# Patient Record
Sex: FEMALE | Race: WHITE | Employment: OTHER | URBAN - METROPOLITAN AREA
[De-identification: names, ages, dates, MRNs, and addresses within clinical notes are randomized per-mention and may not be internally consistent; named-entity substitution may affect disease eponyms.]

---

## 2018-10-15 ENCOUNTER — HOSPITAL ENCOUNTER (OUTPATIENT)
Dept: CT IMAGING | Age: 83
Discharge: HOME | End: 2018-10-15
Payer: MEDICARE

## 2018-10-15 DIAGNOSIS — D3A.029 BENIGN CARCINOID TUMOR OF THE LARGE INTESTINE, UNSPECIFIED PORTION: ICD-10-CM

## 2018-10-15 DIAGNOSIS — D3A.029: ICD-10-CM

## 2018-10-15 LAB
PERFORMED ON: NORMAL
POC SAMPLE TYPE: NORMAL

## 2018-10-15 PROCEDURE — 74177 CT ABD & PELVIS W/CONTRAST: CPT

## 2018-10-15 PROCEDURE — 6360000004 HC RX CONTRAST MEDICATION: Performed by: INTERNAL MEDICINE

## 2018-10-15 RX ADMIN — IOHEXOL 50 ML: 240 INJECTION, SOLUTION INTRATHECAL; INTRAVASCULAR; INTRAVENOUS; ORAL at 10:24

## 2018-10-15 RX ADMIN — IOPAMIDOL 80 ML: 755 INJECTION, SOLUTION INTRAVENOUS at 10:24

## 2019-03-11 ENCOUNTER — PROCEDURE VISIT (OUTPATIENT)
Dept: SURGERY | Age: 84
End: 2019-03-11
Payer: MEDICARE

## 2019-03-11 VITALS
DIASTOLIC BLOOD PRESSURE: 82 MMHG | WEIGHT: 177 LBS | HEART RATE: 71 BPM | SYSTOLIC BLOOD PRESSURE: 119 MMHG | BODY MASS INDEX: 28.45 KG/M2 | OXYGEN SATURATION: 94 %

## 2019-03-11 DIAGNOSIS — C44.02 SQUAMOUS CELL CARCINOMA OF SKIN OF UPPER LIP: Primary | ICD-10-CM

## 2019-03-11 PROCEDURE — 17311 MOHS 1 STAGE H/N/HF/G: CPT | Performed by: DERMATOLOGY

## 2019-03-11 PROCEDURE — 14060 TIS TRNFR E/N/E/L 10 SQ CM/<: CPT | Performed by: DERMATOLOGY

## 2019-03-12 ENCOUNTER — TELEPHONE (OUTPATIENT)
Dept: SURGERY | Age: 84
End: 2019-03-12

## 2019-03-18 ENCOUNTER — OFFICE VISIT (OUTPATIENT)
Dept: SURGERY | Age: 84
End: 2019-03-18

## 2019-03-18 DIAGNOSIS — Z48.02 VISIT FOR SUTURE REMOVAL: Primary | ICD-10-CM

## 2019-03-18 PROCEDURE — 99024 POSTOP FOLLOW-UP VISIT: CPT | Performed by: DERMATOLOGY

## 2019-04-08 ENCOUNTER — TELEPHONE (OUTPATIENT)
Dept: SURGERY | Age: 84
End: 2019-04-08

## 2019-04-08 NOTE — TELEPHONE ENCOUNTER
Returned patients call and discussed applying polysporin twice daily and massaging and if not resolved in a week call back to schedule an appt.

## 2019-04-15 ENCOUNTER — OFFICE VISIT (OUTPATIENT)
Dept: SURGERY | Age: 84
End: 2019-04-15

## 2019-04-15 ENCOUNTER — TELEPHONE (OUTPATIENT)
Dept: SURGERY | Age: 84
End: 2019-04-15

## 2019-04-15 DIAGNOSIS — Z51.89 VISIT FOR WOUND CHECK: Primary | ICD-10-CM

## 2019-04-15 PROCEDURE — 99024 POSTOP FOLLOW-UP VISIT: CPT | Performed by: DERMATOLOGY

## 2019-04-15 NOTE — PROGRESS NOTES
S: The patient is here for scar check s/p Mohs on the right upper cutaneous lip with Unilateral Advancement Flap repair, 5 weeks ago. The patient c/o firmness to scar. Happy with appearance but despite 15 minutes of massage daily for the past few weeks, feels no improvement in firm areas. O: The scar is well-approximated and shows no signs of abnormal healing (expected amount of erythema, fullness and coloration), focal areas of firmness in scar. Very good cosmesis. A/P:scar tissue is firm but wnl for post operative period. rec massage only 5 minutes a day and reassured pt that with time, this scar will soften. Patient questions answered and expectations reviewed. The patient is scheduled for f/u scar check in June and skin examination with General Dermatology per their recommendations.

## 2019-04-15 NOTE — TELEPHONE ENCOUNTER
FYI - Pt called previously spoke with nursing staff and called today with swelling and is concerned if site is healing properly, R upper cutaneous lip from 3/11/19. Pt was added to today's schedule. Please close encounter when read.

## 2019-06-03 ENCOUNTER — OFFICE VISIT (OUTPATIENT)
Dept: SURGERY | Age: 84
End: 2019-06-03
Payer: MEDICARE

## 2019-06-03 DIAGNOSIS — L90.5 SCAR OF LIP: Primary | ICD-10-CM

## 2019-06-03 PROCEDURE — 99024 POSTOP FOLLOW-UP VISIT: CPT | Performed by: DERMATOLOGY

## 2019-06-03 PROCEDURE — 11900 INJECT SKIN LESIONS </W 7: CPT | Performed by: DERMATOLOGY

## 2019-06-03 NOTE — PATIENT INSTRUCTIONS
[facility] Surgery Office Hours:    Monday-Thursday  7:30 AM-4:30 PM    Friday  9:00 AM-3:00 PM    Wound Care Massaging Instruction    Starting at approximately two weeks following surgery, we often ask that our patients begin massaging their wound on a regular basis. We suggest 5 minutes every morning and 5 minutes every night using an emollient such as Aquaphor, Vaseline or Eucerin cream.  The sutures that were placed underneath the surface of the skin take about 70 days to dissolve, and during that time, they can cause lumps along the line of the scar. These lumps will eventually go away on their own, but the use of regular massage will help speed the process as well as help soften the scar tissue more quickly. Please continue to use sunscreen, SPF 45 or higher during this time.

## 2019-06-04 NOTE — PROGRESS NOTES
S: The patient is here for scar check s/p MOhs on the right upper lip with Unilateral Advancement Flap repair, 12 weeks ago. The patient c/o small area of firmness at apex of scar. O: The scar is well-approximated and shows no signs of abnormal healing (expected amount of erythema, fullness and coloration), there is a small area of firmness of scar tissue at medial vermillion. A/P: D/w patient intralesional Kenalog injection to help flatten and soften the scar. The patient opted to proceed with injection. 0.2 of 10mg/cc ILK injected into scar. Instructed to massage. Patient questions answered and expectations reviewed. The patient is scheduled for f/u scar check in 1 month as needed and skin examination with General Dermatology per their recommendations.

## 2019-11-01 ENCOUNTER — HOSPITAL ENCOUNTER (OUTPATIENT)
Dept: CT IMAGING | Age: 84
Discharge: HOME | End: 2019-11-01
Payer: MEDICARE

## 2019-11-01 DIAGNOSIS — C7A.029 MALIGNANT CARCINOID TUMOR OF LARGE INTESTINE, UNSPECIFIED LOCATION (HCC): ICD-10-CM

## 2019-11-01 LAB
GFR AFRICAN AMERICAN: 57
GFR NON-AFRICAN AMERICAN: 47
PERFORMED ON: ABNORMAL
POC CREATININE: 1.1 MG/DL (ref 0.6–1.2)
POC SAMPLE TYPE: ABNORMAL

## 2019-11-01 PROCEDURE — 6360000004 HC RX CONTRAST MEDICATION: Performed by: FAMILY MEDICINE

## 2019-11-01 PROCEDURE — 82565 ASSAY OF CREATININE: CPT

## 2019-11-01 PROCEDURE — 74177 CT ABD & PELVIS W/CONTRAST: CPT

## 2019-11-01 RX ADMIN — IOPAMIDOL 80 ML: 755 INJECTION, SOLUTION INTRAVENOUS at 12:03

## 2019-11-01 RX ADMIN — IOHEXOL 50 ML: 240 INJECTION, SOLUTION INTRATHECAL; INTRAVASCULAR; INTRAVENOUS; ORAL at 12:03

## 2020-11-16 ENCOUNTER — HOSPITAL ENCOUNTER (OUTPATIENT)
Dept: CT IMAGING | Age: 85
Discharge: HOME | End: 2020-11-16
Payer: MEDICARE

## 2020-11-16 PROCEDURE — 82565 ASSAY OF CREATININE: CPT

## 2020-11-16 PROCEDURE — 6360000004 HC RX CONTRAST MEDICATION: Performed by: NURSE PRACTITIONER

## 2020-11-16 PROCEDURE — 74177 CT ABD & PELVIS W/CONTRAST: CPT

## 2020-11-16 RX ADMIN — IOPAMIDOL 80 ML: 755 INJECTION, SOLUTION INTRAVENOUS at 12:35

## 2020-11-16 RX ADMIN — IOHEXOL 50 ML: 240 INJECTION, SOLUTION INTRATHECAL; INTRAVASCULAR; INTRAVENOUS; ORAL at 12:36

## 2023-09-05 ENCOUNTER — TELEPHONE (OUTPATIENT)
Dept: SURGERY | Age: 88
End: 2023-09-05

## 2023-09-05 NOTE — TELEPHONE ENCOUNTER
PT LM on VM- \"she needs to cancel appt for this morning. She's tested positive for covid\"  Please call her as this spot has been bothering her and she was looking forward to surgery.

## 2023-09-06 NOTE — TELEPHONE ENCOUNTER
Pt has Covid and cancelled her appt yesterday 9/5. Is it ok to schedule her without having a test completed? Uncertain of the protocol and it would be best for the nursing staff to disclose that to pt. Or if it's ok for me to proceed in scheduling pt in November I will. (Mohs - [facility], R nasal tip, diagnosed 9/19/2023 by Dr. Joy Martines).

## 2023-11-06 ENCOUNTER — PROCEDURE VISIT (OUTPATIENT)
Dept: SURGERY | Age: 88
End: 2023-11-06
Payer: MEDICARE

## 2023-11-06 VITALS — SYSTOLIC BLOOD PRESSURE: 104 MMHG | DIASTOLIC BLOOD PRESSURE: 77 MMHG | HEART RATE: 71 BPM

## 2023-11-06 DIAGNOSIS — C44.311 BASAL CELL CARCINOMA (BCC) OF RIGHT NASAL TIP: Primary | ICD-10-CM

## 2023-11-06 DIAGNOSIS — Z79.01 ANTICOAGULATED: ICD-10-CM

## 2023-11-06 PROCEDURE — 14060 TIS TRNFR E/N/E/L 10 SQ CM/<: CPT | Performed by: DERMATOLOGY

## 2023-11-06 PROCEDURE — 17312 MOHS ADDL STAGE: CPT | Performed by: DERMATOLOGY

## 2023-11-06 PROCEDURE — 17311 MOHS 1 STAGE H/N/HF/G: CPT | Performed by: DERMATOLOGY

## 2023-11-06 NOTE — PATIENT INSTRUCTIONS
[facility] Surgery Office Hours:    Monday-Thursday  7:30 AM-4:30 PM    Friday  9:00 AM-1:00 PM       POST-OPERATIVE CARE FOR STITCHES  Bandage change after 48 hours    CARING FOR YOUR SURGICAL SITE  The bandage should remain on and completely dry for 48 hours. Do NOT get the bandage wet. After the first 48 hours, gently remove the remaining part of the bandage. It can be helpful to moisten the bandage edges in the shower. Steri strips may still be on the wound. It is ok, they will fall off slowly with the daily bandage changes. Gently clean the wound daily with mild soap and water. Try to clean off crust and debris. Dry (pat) the area with a clean Q-tip or gauze. Apply a layer of Vaseline/ Aquaphor (or Bacitracin if your doctor recommends) to the wound area only. Cut a piece of Telfa (or any non-stick dressing) to fit just over the wound and secure it with paper tape. If the wound is small you may use a Band- Aid. Keep area covered for a total of 1 week(s). If the dressing comes off or if you have questions, or concerns about the dressing, please call the office for instructions! POST OPERATIVE INSTRUCTIONS    Activity: Do not lift anything heavier than a gallon of milk for 1 week. Also, avoid strenuous activity such as running, power walking or contact sports. Eating and drinking: Do not drink alcohol for 48 hours after your procedure. Alcohol increases the chances of bleeding. Medicines   -If you have discomfort, take Acetaminophen (Tylenol or Extra Strength Tylenol). Follow the instructions and warning on the bottle. -If your doctor has prescribed you an Aspirin daily, please keep taking it. Do not take extra Aspirin or medicines containing Aspirin (such as Jane[place] and Excedrin) for 48 hours after your procedure. Bleeding: If bleeding occurs, DO NOT remove the bandage. Put firm pressure on the area with gauze for 20 minutes without peeking.  If the bleeding continues, apply

## 2023-11-06 NOTE — PROGRESS NOTES
PRE-PROCEDURE SCREENING    Pacemaker/ICD: No  Difficulty with numbing in the past: No  Local Anesthesia Reaction/passing out: No  Latex or adhesive allergy:  No  Bleeding/Clotting Disorders: No  Anticoagulant Therapy: Yes - Plavix & Aspirin 81 mg at times (not daily)   Joint prosthesis: No  Artificial Heart Valve: No  Stroke or Seizures: No  Organ Transplant or Lymphoma: No  Immunosuppression: No  Respiratory Problems: Yes  - shortness of breath due to A- Fib at times

## 2023-11-06 NOTE — PROGRESS NOTES
MOHS PROCEDURE NOTE    PHYSICIAN:  Catherine Sellers. Bo Cameron MD, Who operated in two distinct and integrated capacities as the surgeon removing the tissue and as the pathologist examining the tissue. ASSISTANT: Carole Walter RN; Edmundo Aldana RN; Reynaldo Lomeli, [place]     REFERRING PROVIDER:  Adela Gomes MD    PREOPERATIVE DIAGNOSIS: Nodular Basal Cell Carcinoma     SPECIFIC MOHS INDICATIONS:  location, clinically ill-defined borders, and need for tissue conservation    AUC SCORIN/9    POSTOPERATIVE DIAGNOSIS: SAME    LOCATION: Right Nasal Tip    OPERATIVE PROCEDURE:  MOHS MICROGRAPHIC SURGERY    RECONSTRUCTION OF DEFECT: V to Y Advancement Flap (non-tunneled island pedicle flap)    PREOPERATIVE SIZE: 6 x 5 MM    DEFECT SIZE: 13 x 11 MM    LENGTH OF REPAIRED WOUND/SIZE OF FLAP/SIZE OF GRAFT:  6.37cm2    ANESTHESIA:  12.5 mL 0.5% lidocaine with epinephrine 1:200,000 buffered. EBL:  MINIMAL    DURATION OF PROCEDURE:  3 HOURS    POSTOPERATIVE OBSERVATION: 1 HOUR    SPECIMENS:  SEE MOHS MAP    COMPLICATIONS:  NONE    DESCRIPTION OF PROCEDURE:  The patient was given a mirror, as appropriate, and the biopsy site was identified, marked with a surgical marking pen, and verified by the patient. Options for treatment were discussed and the patient was informed that Mohs surgery was the selected treatment based on its lower recurrence rate, given the features listed above, as compared to other treatment modalities such as excision, radiation, or curettage, and agreed with this treatment plan. Risks and benefits including bruising, swelling, bleeding, infection, nerve injury, recurrence, and scarring were discussed with the patient prior to the procedure and a written consent detailing these and other risks was reviewed with the patient and signed. There was a time out for person and procedure verification. The surgical site was prepped with an antiseptic solution.   Application of an antiseptic solution was

## 2023-11-07 ENCOUNTER — OFFICE VISIT (OUTPATIENT)
Dept: SURGERY | Age: 88
End: 2023-11-07

## 2023-11-07 ENCOUNTER — TELEPHONE (OUTPATIENT)
Dept: SURGERY | Age: 88
End: 2023-11-07

## 2023-11-07 DIAGNOSIS — Z51.89 VISIT FOR WOUND CHECK: ICD-10-CM

## 2023-11-07 DIAGNOSIS — R58 BLEEDING: Primary | ICD-10-CM

## 2023-11-07 PROCEDURE — 99024 POSTOP FOLLOW-UP VISIT: CPT | Performed by: DERMATOLOGY

## 2023-11-07 NOTE — TELEPHONE ENCOUNTER
At 9:12 a.m. called patient to gather more information about her bleeding episode. Patient states she has been trying to get the bleeding stopped and needs assistance. Patient coming into office as soon as she can with her . She's aware of our surgery schedule and that we will see her as soon as possible.

## 2023-11-07 NOTE — TELEPHONE ENCOUNTER
The patient was having bleeding as of 3 a.m. this morning. The bleeding has slowed down and had applied 2 sessions of 20 min of bleeding. She's requesting to be seen.

## 2023-11-07 NOTE — PROGRESS NOTES
S: The patient is here today for wound/scar check. The patient had mohs surgery located on the right nasal ala with V to Y Advancement Flap (non-tunneled island pedicle flap) repair, 1 day ago. The patient c/o bleeding overnight. Attempted pressure which slowed the bleeding but did not stop it entirely. EXAM: skin edge bleeding from superior aspect of flap. IMPRESSION/PLAN:  area anesthetized with 2cc 0.5% lido with epi buffered and 4 6-0 Prolene sutures placed for hemostasis. In addition the incision line was draped with surgicel and liquid skin adhesive and a pressure dressing applied. No visible bleeding after observing patient for 10 minutes in office. She left in stable condition. No sign of hematoma. F/u in 6 days.

## 2023-11-07 NOTE — PATIENT INSTRUCTIONS
[facility] Office Hours:    Monday-Thursday  7:30 AM-4:30 PM    Friday  9:00 AM-1:00 PM     PT 's area of bleeding was cleaned/re-sutured,warm compress/saline and nasal spray  used for the dried blood in the nostrils (2 samples nasal spray given to PT to continue to use today to help w/any additional dried blood in her nostrils). Dermabond/surgicel applied  & heavy pressure dressing applied after PT sat for 10 min to determine if any additional bleeding. None noted. PT was advised we will call and check on her abby to make sure all doing ok.

## 2023-11-08 ENCOUNTER — TELEPHONE (OUTPATIENT)
Dept: SURGERY | Age: 88
End: 2023-11-08

## 2023-11-08 NOTE — TELEPHONE ENCOUNTER
The patient was in the office on 11/6/2023 for Mohs located on the RT nasal tip with V-Y advancement flap(non-tunneled pedicle flap) repair. The patient tolerated the procedure well and left the office in good condition. Pain level on post-operative day 1:  none & no Tylenol has been needed. She feels she is doing well. Bandage stayed on and no additional bleeding after PT was seen 11/7/2023 for bleeding and bandage change. Dr. Trevin Oneal advised that PT doing well today. Any bleeding episode that required pressure to be held, bandage change or a call to the office or MD?  no     Any other issues?:  no    A post-operative telephone call was placed at 9:17a, 11/8/2023,  in order to check on the patient's recovery process. The patient reported doing well and had no complaints other than those listed above, if any. All of the patient's questions were answered. Advised to call w/any questions/concerns.

## 2023-11-13 ENCOUNTER — OFFICE VISIT (OUTPATIENT)
Dept: SURGERY | Age: 88
End: 2023-11-13

## 2023-11-13 DIAGNOSIS — Z48.02 VISIT FOR SUTURE REMOVAL: Primary | ICD-10-CM

## 2023-11-13 PROCEDURE — 99024 POSTOP FOLLOW-UP VISIT: CPT | Performed by: DERMATOLOGY

## 2023-11-13 NOTE — PROGRESS NOTES
S:  The patient is here for suture removal s/p Mohs surgery on the right nasal tip and V to Y Advancement Flap (non-tunneled island pedicle flap) repair, 1 week(s) ago. The site appears well-healed without signs of infection (redness, pain or discharge). The sutures were removed. Flap looks very good- few areas still healing but overall nice take and good alar position. Wound care and activity instructions given. The patient was scheduled for follow-up 2 weeks for scar/wound check. The patient was scheduled for f/u with General Dermatology per their instructions. Electronically signed by Sarah Beth Corey RN on 11/13/2023 at 3:14 PM    I, Sarah Beth Corey RN, am scribing for and in the presence of Dr.Emily Jerome,11/13/23. José Manuel Bateman MD,  personally performed the services described in this documentation as scribed by Sarah Beth Corey RN  in my presence, and it is both accurate and complete.      Electronically signed by Tyra Larry MD on 11/13/2023 at 3:36 PM

## 2023-11-28 ENCOUNTER — OFFICE VISIT (OUTPATIENT)
Dept: SURGERY | Age: 88
End: 2023-11-28

## 2023-11-28 DIAGNOSIS — Z51.89 VISIT FOR WOUND CHECK: Primary | ICD-10-CM

## 2023-11-28 PROCEDURE — 99024 POSTOP FOLLOW-UP VISIT: CPT | Performed by: DERMATOLOGY

## 2023-11-28 NOTE — PROGRESS NOTES
S: The patient is here for wound check s/p Mohs on the right nasal tip with V to Y Advancement Flap (non-tunneled island pedicle flap) repair, 3 weeks ago. The patient denies any complaints and feels the area is healing well without complications. O: The wound/scar shows no signs of infection/bleeding or other complications (no erythema, edema, pain, purulence or drainage). Flap has healed well and pt has normal alar contour. Scar tissue in inner nares should flatten with time. Pt reports no issues with breathing. .  A/P:  No issues. Patient questions answered and expectations reviewed. The patient is scheduled for f/u scar check in 2-3 months prn and skin examination with General Dermatology per their recommendations. Abi Castanon RN, am scribing for and in the presence of Dr. Howell. Electronically signed by Emerson Calhoun RN on 11/28/2023 at 3:11 PM     I, Booker Chaparro MD,  personally performed the services described in this documentation as scribed by Emerson Calhoun RN  in my presence, and it is both accurate and complete.      Electronically signed by Joanie Leal MD on 11/28/2023 at 3:24 PM

## 2023-11-28 NOTE — PATIENT INSTRUCTIONS
[facility] Surgery Office Hours:    Monday-Thursday  7:30 AM-4:30 PM    Friday  9:00 AM-1:00 PM    Apply vaseline or aquaphor over the tip for a week or two. Wound Care Massaging Instruction    Starting at approximately about 4 weeks following surgery, we often ask that our patients begin massaging their wound on a regular basis. We suggest just a few minutes once a day. It is helpful to use an emollient such as Aquaphor, Vaseline or Eucerin cream.  The sutures that were placed underneath the surface of the skin take about 70 days to dissolve, and during that time, they can cause lumps along the line of the scar. These lumps will eventually go away on their own, but the use of regular massage will help speed the process as well as help soften the scar tissue more quickly. Please continue to use sunscreen, SPF 45 or higher during this time.

## 2024-01-29 ENCOUNTER — OFFICE VISIT (OUTPATIENT)
Dept: SURGERY | Age: 89
End: 2024-01-29

## 2024-01-29 DIAGNOSIS — L90.5 SCAR: Primary | ICD-10-CM

## 2024-01-29 PROCEDURE — 99024 POSTOP FOLLOW-UP VISIT: CPT | Performed by: DERMATOLOGY

## 2024-01-30 NOTE — PROGRESS NOTES
S: The patient is here for scar check s/p mohs on the right ala with V to Y Advancement Flap (non-tunneled island pedicle flap) repair, 11 weeks ago.  The patient c/o persistent slight fullness inside nose but no issues with air movement/collapse.  O: The scar is well-approximated and shows no signs of abnormal healing (expected amount of erythema, fullness and coloration), the scar is slightly elevated. No valve collapse  A/P:scar with slight elevation - offered pt ilk but she declined today.  Cont massage and will likely still continue to improve with time.  Patient questions answered and expectations reviewed.  The patient is scheduled for f/u scar check as needed and skin examination with General Dermatology per their recommendations.

## 2024-07-22 ENCOUNTER — OFFICE VISIT (OUTPATIENT)
Dept: SURGERY | Age: 89
End: 2024-07-22
Payer: MEDICARE

## 2024-07-22 ENCOUNTER — TELEPHONE (OUTPATIENT)
Dept: SURGERY | Age: 89
End: 2024-07-22

## 2024-07-22 DIAGNOSIS — D48.9 NEOPLASM OF UNCERTAIN BEHAVIOR: ICD-10-CM

## 2024-07-22 DIAGNOSIS — L90.5 SCAR: Primary | ICD-10-CM

## 2024-07-22 PROCEDURE — 99212 OFFICE O/P EST SF 10 MIN: CPT | Performed by: DERMATOLOGY

## 2024-07-22 PROCEDURE — 1123F ACP DISCUSS/DSCN MKR DOCD: CPT | Performed by: DERMATOLOGY

## 2024-07-22 PROCEDURE — 11102 TANGNTL BX SKIN SINGLE LES: CPT | Performed by: DERMATOLOGY

## 2024-07-22 NOTE — PATIENT INSTRUCTIONS
[facility] Office Hours:    Monday-Thursday  7:30 AM-4:30 PM    Friday  9:00 AM-1:00 PM     Biopsy Wound Care Instructions  Cleanse the wound with mild soapy water daily.   Gently dry the area.  Apply Vaseline or petroleum jelly to the wound using a cotton tipped applicator.  Cover with a clean bandage.  Repeat this process until the biopsy site is healed.  If you had stitches placed, continue treating the site until the stitches are removed. Remember to make an appointment to return to have your stitches removed by our staff.  You may shower and bathe as usual.   ** Biopsy results generally take around 7 business days to come back.  If you have not heard from us by then, please call the office at [phone] between 8AM and 4PM Monday through Friday.

## 2024-07-22 NOTE — PROGRESS NOTES
S: The patient is here for scar check s/p mohs on the right nose for a nodular bcc with V to Y Advancement Flap (non-tunneled island pedicle flap) repair, 8 months ago.  The patient c/o a bump in the area of the scar and feels nose looks swollen.    She also c/o new onset lesion on left hand x 3 weeks. No previous tx.    O: The scar is well-approximated and shows no signs of abnormal healing (expected amount of erythema, fullness and coloration), scar has excellent cosmesis.    Left hand with a 10mm keratotic erythematous papule    A/P:  scar with very good cosmesis. I do not appreciate any excessive fullness or change in shape to the nose. It looks symmetric and with even coloring from scar tissue to unaffected portions of the nose. No s/sx of recurrence. No functional issues.    2. Neoplasm of uncertain behavior:  R/O SCC  Location: left hand  - Discussed possible diagnosis. Patient agreeable to biopsy. Verbal consent obtained after risks (infection, bleeding, scar), benefits and alternatives explained.   - The area to be biopsied was marked and a verbal time-out was performed.  - Local anesthesia was achieved with 1% lidocaine with epinephrine/sodium bicarbonate.   - The area was cleansed with alcohol and a tangential shave biopsy was performed using a derma-blade.  Hemostasis was achieved with aluminum chloride.  A small amount of petroleum jelly was applied to the wound and a band-aid applied.   - The specimen was placed in a correctly identified specimen container.  - One specimen was sent to pathology. There were no immediate complications and the patient left the office in good condition.  -  Patient educated re: risk of bleeding, infection, scar and wound care instructions reviewed.  The patient will be informed of biopsy results by phone or letter as soon as available.     Patient questions answered and expectations reviewed.  The patient is scheduled for f/u scar check as needed and skin examination with

## 2024-07-22 NOTE — TELEPHONE ENCOUNTER
Pt complaining of a bump on the incision line, she noticed it a month ago and she says it's not right.  She complains of swelling and discoloration. She mentioned that her  and daughter claims it does not look right as well and wanted Dr. Jerome to check it.     Please call cell phone at [phone].      Pt unable to send photo.  I added her the schedule today for 3:15 pm; if she should not come in please call pt and provide another date.

## 2024-07-25 ENCOUNTER — TELEPHONE (OUTPATIENT)
Dept: SURGERY | Age: 89
End: 2024-07-25

## 2024-07-25 LAB — DERMATOLOGY PATHOLOGY REPORT: ABNORMAL

## 2024-07-25 NOTE — TELEPHONE ENCOUNTER
I spoke with the patient today by telephone.  I reviewed results of the biopsy with the patient.    Date of biopsy: 7/22/24  Site of biopsy: Left Hand  Result: SCC, well diff    Plan: needs MOHS procedure    The patient expressed understanding of the plan.

## 2024-07-25 NOTE — TELEPHONE ENCOUNTER
----- Message from Chelsea Jerome MD sent at 7/25/2024 12:51 PM EDT -----  Biopsy positive for skin cancer - needs Mohs.

## 2024-08-26 ENCOUNTER — PROCEDURE VISIT (OUTPATIENT)
Dept: SURGERY | Age: 89
End: 2024-08-26
Payer: MEDICARE

## 2024-08-26 VITALS — DIASTOLIC BLOOD PRESSURE: 72 MMHG | HEART RATE: 80 BPM | SYSTOLIC BLOOD PRESSURE: 134 MMHG

## 2024-08-26 DIAGNOSIS — C44.629 SQUAMOUS CELL CARCINOMA OF LEFT HAND: Primary | ICD-10-CM

## 2024-08-26 DIAGNOSIS — Z79.01 ANTICOAGULATED: ICD-10-CM

## 2024-08-26 PROCEDURE — 12042 INTMD RPR N-HF/GENIT2.6-7.5: CPT | Performed by: DERMATOLOGY

## 2024-08-26 PROCEDURE — 17311 MOHS 1 STAGE H/N/HF/G: CPT | Performed by: DERMATOLOGY

## 2024-08-26 NOTE — PROGRESS NOTES
PRE-PROCEDURE SCREENING    Pacemaker/ICD: No, but has a watchman  Difficulty with numbing in the past: No  Local Anesthesia Reaction/passing out: No  Latex or adhesive allergy:  No  Any history of reaction to suture or skin glue:  no  Bleeding/Clotting Disorders: No  Anticoagulant Therapy: Yes, xarelto  Joint prosthesis: No  Artificial Heart Valve: No  Stroke or Seizures: No  Organ Transplant or Lymphoma: No  Immunosuppression: No  Respiratory Problems: No

## 2024-08-26 NOTE — PROGRESS NOTES
MOHS PROCEDURE NOTE    PHYSICIAN:  Chelsea Jerome MD, Who operated in two distinct and integrated capacities as the surgeon removing the tissue and as the pathologist examining the tissue.    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     EBL:  MINIMAL    DURATION OF PROCEDURE:  1.5 HOURS    POSTOPERATIVE OBSERVATION: 0.5 HOUR    SPECIMENS:  SEE MOHS MAP    COMPLICATIONS:  NONE    DESCRIPTION OF PROCEDURE:  The patient was given a mirror, as appropriate, and the biopsy site was identified, marked with a surgical marking pen, and verified by the patient.   Options for treatment were discussed and the patient was informed that Mohs surgery was the selected treatment based on its lower recurrence rate, given the features listed above, as compared to other treatment modalities such as excision, radiation, or curettage, and agreed with this treatment plan.  Risks and benefits including bruising, swelling, bleeding, infection, nerve injury, recurrence, and scarring were discussed with the patient prior to the procedure and a written consent detailing these and other risks was reviewed with the patient and signed.    There was a time out for person and procedure verification.  The surgical site was prepped with an antiseptic solution.  Application of an antiseptic solution was repeated before each surgical stage.      Stage I:  The clinically-apparent tumor was carefully

## 2024-08-26 NOTE — PATIENT INSTRUCTIONS
[facility] Office Hours:    Monday-Thursday  7:30 AM-4:30 PM    Friday  9:00 AM-1:00 PM      POST-OPERATIVE CARE FOR STITCHES  Bandage change after 48 hours    CARING FOR YOUR SURGICAL SITE  The bandage should remain on and completely dry for 48 hours. Do NOT get the bandage wet.  After the first 48 hours, gently remove the remaining part of the bandage. It can be helpful to moisten the bandage edges in the shower. Steri strips may still be on the wound. It is ok, they will fall off slowly with the daily bandage changes.  Gently clean the wound daily with mild soap and water. Try to clean off crust and debris.   Dry (pat) the area with a clean Q-tip or gauze.   Apply a layer of Vaseline/ Aquaphor (or Bacitracin if your doctor recommends) to the wound area only.  Cut a piece of Telfa (or any non-stick dressing) to fit just over the wound and secure it with paper tape. If the wound is small you may use a Band- Aid. Keep area covered for a total of 2 week(s).  If the dressing comes off or if you have questions, or concerns about the dressing, please call the office for instructions!    POST OPERATIVE INSTRUCTIONS    Activity: Do not lift anything heavier than a gallon of milk for 1 week. Also, avoid strenuous activity such as running, power walking or contact sports.  Eating and drinking: Do not drink alcohol for 48 hours after your procedure. Alcohol increases the chances of bleeding.  Medicines   -If you have discomfort, take Acetaminophen (Tylenol or Extra Strength Tylenol). Follow the instructions and warning on the bottle.  -If your doctor has prescribed you an Aspirin daily, please keep taking it. Do not take extra Aspirin or medicines containing Aspirin (such as [place] and Excedrin) for 48 hours after your procedure.    Bleeding: If bleeding occurs, DO NOT remove the bandage. Put firm pressure on the area with gauze for 20 minutes without peeking. If the bleeding continues, apply

## 2024-08-27 ENCOUNTER — TELEPHONE (OUTPATIENT)
Dept: SURGERY | Age: 89
End: 2024-08-27

## 2024-08-27 NOTE — TELEPHONE ENCOUNTER
The patient was in the office on 8/26/2024 for mohs located on the left hand with ILC repair.  The patient tolerated the procedure well and left the office in good condition.    Pain level on post-operative day 1:  Took 1 tylenol at 9pm and more this morning; it \"burns\"    Any bleeding episode that required pressure to be held, bandage change or a call to the office or MD?  no     Any other issues?:  THe hand is swollen but has iced    A post-operative telephone call was placed at 8/27/2024 in order to check on the patient's recovery process.  The patient reported doing well and had no complaints other than those listed above, if any.  All of the patient's questions were answered.

## 2024-09-09 ENCOUNTER — NURSE ONLY (OUTPATIENT)
Dept: SURGERY | Age: 89
End: 2024-09-09

## 2024-09-09 DIAGNOSIS — Z48.02 VISIT FOR SUTURE REMOVAL: Primary | ICD-10-CM

## 2024-10-28 ENCOUNTER — TELEPHONE (OUTPATIENT)
Dept: SURGERY | Age: 89
End: 2024-10-28

## 2024-10-28 NOTE — TELEPHONE ENCOUNTER
Pt called this am and states her surgery was August 27 and she has a bump on her hand that looks like a pimple. There is some irritation.

## 2024-10-28 NOTE — TELEPHONE ENCOUNTER
Advised the patient to use Polysporin and massage; also discussed with her doing warm compresses. She said she had done a little massaging but with try the other recommendations. If not improvement still she will call us back so she can come in to have it checked out. No further questions.

## 2025-02-24 ENCOUNTER — TELEPHONE (OUTPATIENT)
Dept: SURGERY | Age: 89
End: 2025-02-24

## 2025-03-13 ENCOUNTER — PROCEDURE VISIT (OUTPATIENT)
Dept: SURGERY | Age: 89
End: 2025-03-13
Payer: MEDICARE

## 2025-03-13 VITALS — DIASTOLIC BLOOD PRESSURE: 96 MMHG | SYSTOLIC BLOOD PRESSURE: 129 MMHG | HEART RATE: 76 BPM

## 2025-03-13 DIAGNOSIS — Z79.01 ANTICOAGULATED: ICD-10-CM

## 2025-03-13 DIAGNOSIS — C44.729 SQUAMOUS CELL CARCINOMA OF LOWER LEG, LEFT: Primary | ICD-10-CM

## 2025-03-13 PROCEDURE — 12032 INTMD RPR S/A/T/EXT 2.6-7.5: CPT | Performed by: DERMATOLOGY

## 2025-03-13 PROCEDURE — 17313 MOHS 1 STAGE T/A/L: CPT | Performed by: DERMATOLOGY

## 2025-03-13 NOTE — PROGRESS NOTES
MOHS PROCEDURE NOTE    PHYSICIAN:  Chelsea Jerome MD, Who operated in two distinct and integrated capacities as the surgeon removing the tissue and as the pathologist examining the tissue.    ASSISTANT: Jody Cruz RN, Ar Sullivan RN     REFERRING PROVIDER:  Leatha Aguirre MD    PREOPERATIVE DIAGNOSIS: Invasive well-differentiated Squamous Cell Carcinoma     SPECIFIC MOHS INDICATIONS:  size, location, and need for tissue conservation    AUC SCORIN/9    POSTOPERATIVE DIAGNOSIS: SAME    LOCATION: Left lateral calf/pretibia    OPERATIVE PROCEDURE:  MOHS MICROGRAPHIC SURGERY    RECONSTRUCTION OF DEFECT: Intermediate layered closure    PREOPERATIVE SIZE: 14x9 MM    DEFECT SIZE: 20x15 MM    LENGTH OF REPAIRED WOUND/SIZE OF FLAP/SIZE OF GRAFT:  30 MM    ANESTHESIA:  10mL 1% lidocaine with epinephrine 1:100,000 buffered.     EBL:  MINIMAL    DURATION OF PROCEDURE:  40 MINUTES    POSTOPERATIVE OBSERVATION: 40 MINUTES    SPECIMENS:  SEE MOHS MAP    COMPLICATIONS:  NONE    DESCRIPTION OF PROCEDURE:  The patient was given a mirror, as appropriate, and the biopsy site was identified, marked with a surgical marking pen, and verified by the patient.   Options for treatment were discussed and the patient was informed that Mohs surgery was the selected treatment based on its lower recurrence rate, given the features listed above, as compared to other treatment modalities such as excision, radiation, or curettage, and agreed with this treatment plan.  Risks and benefits including bruising, swelling, bleeding, infection, nerve injury, recurrence, and scarring were discussed with the patient prior to the procedure and a written consent detailing these and other risks was reviewed with the patient and signed.    There was a time out for person and procedure verification.  The surgical site was prepped with an antiseptic solution.  Application of an antiseptic solution was repeated before each surgical stage.

## 2025-03-13 NOTE — PROGRESS NOTES
PRE-PROCEDURE SCREENING    Pacemaker/ICD: No, but has a Watchman  Difficulty with numbing in the past: No  Local Anesthesia Reaction/passing out: No  Latex or adhesive allergy:  No  Any history of reaction to suture or skin glue:  no  Bleeding/Clotting Disorders: No  Anticoagulant Therapy: Yes, Odilia had recent stroke  Joint prosthesis: No  Artificial Heart Valve: No  Stroke or Seizures: Yes, stroke 3wks ago  Organ Transplant or Lymphoma: No  Immunosuppression: No  Respiratory Problems: No

## 2025-03-13 NOTE — PATIENT INSTRUCTIONS
[facility] Office Hours:    Monday-Thursday  7:30 AM-4:30 PM    Friday  9:00 AM-1:00 PM       POST-OPERATIVE CARE FOR STITCHES  Bandage change after 48 hours    CARING FOR YOUR SURGICAL SITE  The bandage should remain on and completely dry for 48 hours. Do NOT get the bandage wet.  After the first 48 hours, gently remove the remaining part of the bandage. It can be helpful to moisten the bandage edges in the shower. Steri strips may still be on the wound. It is ok, they will fall off slowly with the daily bandage changes.  Gently clean the wound daily with mild soap and water. Try to clean off crust and debris.   Dry (pat) the area with a clean Q-tip or gauze.   Apply a layer of Vaseline/ Aquaphor (or Bacitracin if your doctor recommends) to the wound area only.  Cut a piece of Telfa (or any non-stick dressing) to fit just over the wound and secure it with paper tape. If the wound is small you may use a Band- Aid. Keep area covered for a total of 2 week(s).  If the dressing comes off or if you have questions, or concerns about the dressing, please call the office for instructions!    POST OPERATIVE INSTRUCTIONS    Activity: Do not lift anything heavier than a gallon of milk for 1 week. Also, avoid strenuous activity such as running, power walking or contact sports.  Eating and drinking: Do not drink alcohol for 48 hours after your procedure. Alcohol increases the chances of bleeding.  Medicines   -If you have discomfort, take Acetaminophen (Tylenol or Extra Strength Tylenol). Follow the instructions and warning on the bottle.  -If your doctor has prescribed you an Aspirin daily, please keep taking it. Do not take extra Aspirin or medicines containing Aspirin (such as [place] and Excedrin) for 48 hours after your procedure.    Bleeding: If bleeding occurs, DO NOT remove the bandage. Put firm pressure on the area with gauze for 20 minutes without peeking. If the bleeding continues, apply

## 2025-03-14 ENCOUNTER — TELEPHONE (OUTPATIENT)
Dept: SURGERY | Age: 89
End: 2025-03-14

## 2025-03-14 NOTE — TELEPHONE ENCOUNTER
The patient was in the office on 3/13/25 for MOHS procedure  located on the left lateral calf with ILC repair.  The patient tolerated the procedure well and left the office in good condition.    Pain level on post-operative day 1:  none and level of pain (1-10, 10 severe), has not needed tylenol or any pain meds.    Any bleeding episode that required pressure to be held, bandage change or a call to the office or MD?  no     Any other issues?:  No, but was educated about elevating her leg for swelling, wear ace wrap during the day, and change the bandage if it gets wet. Client verbalized understanding.    A post-operative telephone call was placed at 11:58 am in order to check on the patient's recovery process.  The patient reported doing well and had no complaints other than those listed above, if any.  All of the patient's questions were answered.

## 2025-03-27 ENCOUNTER — CLINICAL SUPPORT (OUTPATIENT)
Dept: SURGERY | Age: 89
End: 2025-03-27

## 2025-03-27 DIAGNOSIS — Z48.02 VISIT FOR SUTURE REMOVAL: Primary | ICD-10-CM

## 2025-03-27 NOTE — PROGRESS NOTES
S:  The patient is here for suture removal s/p Mohs surgery on the left lateral calf/pretibia and Intermediate layered closure repair, 2 week ago. The site appears well-healed without signs of infection (redness, pain or discharge). The sutures were removed.  Wound care and activity instructions given.  The patient was scheduled for follow-up prn for scar/wound check.  The patient was scheduled for f/u with General Dermatology per their instructions.

## 2025-03-27 NOTE — PATIENT INSTRUCTIONS
[facility] Office Hours:    Monday-Thursday  7:30 AM-4:30 PM    Friday  9:00 AM-1:00 PM      WOUND CARE AFTER SUTURE REMOVAL    After your stiches have been removed, your scar is still very fragile. In fact, scars continue to change and evolve, what we call remodel, for about a year after your procedure.  Follow the following steps below to ensure that your scar heals well.    Instructions    1. If Steri-strips were applied, keep them on until they fall off on their own.  2. Protect your scar from the sun. Use a sunscreen or bandage to cover your scar. Sun exposure can cause your scar to become discolored and appear red or brown.  3. To help soften your scar more rapidly, it is helpful, but not necessary, for you to   massage the scar gently each night for twenty minutes.   4. “Spitting” suture. Occasionally, an inside suture (stitch) does not completely dissolve. When this happens, (generally 4-8 weeks after surgery), it causes a bump or “pimple” to form on the scar. This is easily removed and is not at all serious. It   does not mean the skin cancer has returned. Contact us if it happens, but do not be alarmed.      5. If you scar becomes tender, itchy or becomes very large, let Dr. Jerome know.  There    are treatments that can improve the appearance of your scar or help make it more comfortable.

## 2025-04-16 ENCOUNTER — HOSPITAL ENCOUNTER (EMERGENCY)
Age: 89
Discharge: HOME | End: 2025-04-16
Attending: EMERGENCY MEDICINE
Payer: MEDICARE

## 2025-04-16 VITALS
DIASTOLIC BLOOD PRESSURE: 110 MMHG | WEIGHT: 178.2 LBS | BODY MASS INDEX: 28.64 KG/M2 | OXYGEN SATURATION: 96 % | HEIGHT: 66 IN | TEMPERATURE: 97.3 F | HEART RATE: 64 BPM | RESPIRATION RATE: 20 BRPM | SYSTOLIC BLOOD PRESSURE: 175 MMHG

## 2025-04-16 DIAGNOSIS — R04.0 EPISTAXIS: Primary | ICD-10-CM

## 2025-04-16 PROCEDURE — 6370000000 HC RX 637 (ALT 250 FOR IP): Performed by: EMERGENCY MEDICINE

## 2025-04-16 PROCEDURE — 99283 EMERGENCY DEPT VISIT LOW MDM: CPT

## 2025-04-16 PROCEDURE — 2500000003 HC RX 250 WO HCPCS: Performed by: EMERGENCY MEDICINE

## 2025-04-16 RX ORDER — TRANEXAMIC ACID 100 MG/ML
1000 INJECTION, SOLUTION INTRAVENOUS ONCE
Status: COMPLETED | OUTPATIENT
Start: 2025-04-16 | End: 2025-04-16

## 2025-04-16 RX ORDER — OXYMETAZOLINE HYDROCHLORIDE 0.05 G/100ML
2 SPRAY NASAL ONCE
Status: COMPLETED | OUTPATIENT
Start: 2025-04-16 | End: 2025-04-16

## 2025-04-16 RX ADMIN — OXYMETAZOLINE HYDROCHLORIDE 2 SPRAY: 0.5 SPRAY NASAL at 02:44

## 2025-04-16 RX ADMIN — TRANEXAMIC ACID 1000 MG: 100 INJECTION, SOLUTION INTRAVENOUS at 02:45

## 2025-04-16 ASSESSMENT — PAIN - FUNCTIONAL ASSESSMENT: PAIN_FUNCTIONAL_ASSESSMENT: 0-10

## 2025-04-16 ASSESSMENT — PAIN SCALES - GENERAL: PAINLEVEL_OUTOF10: 6

## 2025-04-16 ASSESSMENT — LIFESTYLE VARIABLES
HOW OFTEN DO YOU HAVE A DRINK CONTAINING ALCOHOL: MONTHLY OR LESS
HOW MANY STANDARD DRINKS CONTAINING ALCOHOL DO YOU HAVE ON A TYPICAL DAY: 1 OR 2

## 2025-04-16 ASSESSMENT — PAIN DESCRIPTION - ORIENTATION: ORIENTATION: INNER

## 2025-04-16 ASSESSMENT — PAIN DESCRIPTION - LOCATION: LOCATION: HEAD

## 2025-04-16 ASSESSMENT — PAIN DESCRIPTION - DESCRIPTORS: DESCRIPTORS: ACHING

## 2025-05-26 ENCOUNTER — APPOINTMENT (OUTPATIENT)
Dept: CT IMAGING | Age: 89
DRG: 087 | End: 2025-05-26
Payer: MEDICARE

## 2025-05-26 ENCOUNTER — APPOINTMENT (OUTPATIENT)
Dept: GENERAL RADIOLOGY | Age: 89
DRG: 087 | End: 2025-05-26
Payer: MEDICARE

## 2025-05-26 ENCOUNTER — HOSPITAL ENCOUNTER (INPATIENT)
Age: 89
LOS: 2 days | Discharge: HOME | DRG: 087 | End: 2025-05-28
Attending: EMERGENCY MEDICINE | Admitting: INTERNAL MEDICINE
Payer: MEDICARE
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
## 2025-05-26 DIAGNOSIS — S06.6X0A SUBARACHNOID HEMORRHAGE FOLLOWING INJURY, NO LOSS OF CONSCIOUSNESS, INITIAL ENCOUNTER (HCC): Primary | ICD-10-CM

## 2025-05-26 DIAGNOSIS — S01.01XA LACERATION OF SCALP, INITIAL ENCOUNTER: ICD-10-CM

## 2025-05-26 PROBLEM — I60.9 SUBARACHNOID HEMORRHAGE (HCC): Status: ACTIVE | Noted: 2025-05-26

## 2025-05-26 LAB
ANION GAP SERPL CALCULATED.3IONS-SCNC: 14 MMOL/L (ref 3–16)
BASOPHILS # BLD: 0 K/UL (ref 0–0.2)
BASOPHILS NFR BLD: 0.3 %
BUN SERPL-MCNC: 15 MG/DL (ref 7–20)
CALCIUM SERPL-MCNC: 9.7 MG/DL (ref 8.3–10.6)
CHLORIDE SERPL-SCNC: 101 MMOL/L (ref 99–110)
CO2 SERPL-SCNC: 21 MMOL/L (ref 21–32)
CREAT SERPL-MCNC: 1 MG/DL (ref 0.6–1.2)
DEPRECATED RDW RBC AUTO: 14.5 % (ref 12.4–15.4)
EKG DIAGNOSIS: NORMAL
EKG Q-T INTERVAL: 364 MS
EKG QRS DURATION: 82 MS
EKG QTC CALCULATION (BAZETT): 435 MS
EKG R AXIS: 53 DEGREES
EKG T AXIS: 59 DEGREES
EKG VENTRICULAR RATE: 86 BPM
EOSINOPHIL # BLD: 0.1 K/UL (ref 0–0.6)
EOSINOPHIL NFR BLD: 0.5 %
GFR SERPLBLD CREATININE-BSD FMLA CKD-EPI: 54 ML/MIN/{1.73_M2}
GLUCOSE SERPL-MCNC: 135 MG/DL (ref 70–99)
HCT VFR BLD AUTO: 47.4 % (ref 36–48)
HGB BLD-MCNC: 16.3 G/DL (ref 12–16)
LYMPHOCYTES # BLD: 1.4 K/UL (ref 1–5.1)
LYMPHOCYTES NFR BLD: 10.5 %
MCH RBC QN AUTO: 33.8 PG (ref 26–34)
MCHC RBC AUTO-ENTMCNC: 34.5 G/DL (ref 31–36)
MCV RBC AUTO: 98.1 FL (ref 80–100)
MONOCYTES # BLD: 0.7 K/UL (ref 0–1.3)
MONOCYTES NFR BLD: 4.9 %
NEUTROPHILS # BLD: 11 K/UL (ref 1.7–7.7)
NEUTROPHILS NFR BLD: 83.8 %
PLATELET # BLD AUTO: 195 K/UL (ref 135–450)
PMV BLD AUTO: 9 FL (ref 5–10.5)
POTASSIUM SERPL-SCNC: 4 MMOL/L (ref 3.5–5.1)
RBC # BLD AUTO: 4.83 M/UL (ref 4–5.2)
SODIUM SERPL-SCNC: 136 MMOL/L (ref 136–145)
TROPONIN, HIGH SENSITIVITY: 22 NG/L (ref 0–14)
TROPONIN, HIGH SENSITIVITY: 25 NG/L (ref 0–14)
WBC # BLD AUTO: 13.2 K/UL (ref 4–11)

## 2025-05-26 PROCEDURE — 80048 BASIC METABOLIC PNL TOTAL CA: CPT

## 2025-05-26 PROCEDURE — 6360000002 HC RX W HCPCS: Performed by: INTERNAL MEDICINE

## 2025-05-26 PROCEDURE — 96374 THER/PROPH/DIAG INJ IV PUSH: CPT

## 2025-05-26 PROCEDURE — 70450 CT HEAD/BRAIN W/O DYE: CPT

## 2025-05-26 PROCEDURE — 2580000003 HC RX 258

## 2025-05-26 PROCEDURE — 6370000000 HC RX 637 (ALT 250 FOR IP): Performed by: INTERNAL MEDICINE

## 2025-05-26 PROCEDURE — 2060000000 HC ICU INTERMEDIATE R&B

## 2025-05-26 PROCEDURE — 71045 X-RAY EXAM CHEST 1 VIEW: CPT

## 2025-05-26 PROCEDURE — 6360000002 HC RX W HCPCS

## 2025-05-26 PROCEDURE — 99285 EMERGENCY DEPT VISIT HI MDM: CPT

## 2025-05-26 PROCEDURE — 85025 COMPLETE CBC W/AUTO DIFF WBC: CPT

## 2025-05-26 PROCEDURE — 93005 ELECTROCARDIOGRAM TRACING: CPT

## 2025-05-26 PROCEDURE — 0HQ0XZZ REPAIR SCALP SKIN, EXTERNAL APPROACH: ICD-10-PCS | Performed by: EMERGENCY MEDICINE

## 2025-05-26 PROCEDURE — 90471 IMMUNIZATION ADMIN: CPT

## 2025-05-26 PROCEDURE — 90715 TDAP VACCINE 7 YRS/> IM: CPT

## 2025-05-26 PROCEDURE — 2500000003 HC RX 250 WO HCPCS: Performed by: INTERNAL MEDICINE

## 2025-05-26 PROCEDURE — 84484 ASSAY OF TROPONIN QUANT: CPT

## 2025-05-26 PROCEDURE — 12002 RPR S/N/AX/GEN/TRNK2.6-7.5CM: CPT

## 2025-05-26 RX ORDER — SODIUM CHLORIDE 9 MG/ML
INJECTION, SOLUTION INTRAVENOUS PRN
Status: DISCONTINUED | OUTPATIENT
Start: 2025-05-26 | End: 2025-05-28 | Stop reason: HOSPADM

## 2025-05-26 RX ORDER — SODIUM CHLORIDE 0.9 % (FLUSH) 0.9 %
5-40 SYRINGE (ML) INJECTION PRN
Status: DISCONTINUED | OUTPATIENT
Start: 2025-05-26 | End: 2025-05-28 | Stop reason: HOSPADM

## 2025-05-26 RX ORDER — ONDANSETRON 2 MG/ML
4 INJECTION INTRAMUSCULAR; INTRAVENOUS ONCE
Status: COMPLETED | OUTPATIENT
Start: 2025-05-26 | End: 2025-05-26

## 2025-05-26 RX ORDER — POLYETHYLENE GLYCOL 3350 17 G/17G
17 POWDER, FOR SOLUTION ORAL DAILY PRN
Status: DISCONTINUED | OUTPATIENT
Start: 2025-05-26 | End: 2025-05-28 | Stop reason: HOSPADM

## 2025-05-26 RX ORDER — METOPROLOL SUCCINATE 25 MG/1
25 TABLET, EXTENDED RELEASE ORAL DAILY
Status: DISCONTINUED | OUTPATIENT
Start: 2025-05-27 | End: 2025-05-27

## 2025-05-26 RX ORDER — AMLODIPINE BESYLATE 10 MG/1
10 TABLET ORAL DAILY
Status: DISCONTINUED | OUTPATIENT
Start: 2025-05-27 | End: 2025-05-27

## 2025-05-26 RX ORDER — HYDRALAZINE HYDROCHLORIDE 20 MG/ML
5 INJECTION INTRAMUSCULAR; INTRAVENOUS EVERY 6 HOURS PRN
Status: DISCONTINUED | OUTPATIENT
Start: 2025-05-26 | End: 2025-05-28 | Stop reason: HOSPADM

## 2025-05-26 RX ORDER — PROCHLORPERAZINE EDISYLATE 5 MG/ML
10 INJECTION INTRAMUSCULAR; INTRAVENOUS ONCE
Status: COMPLETED | OUTPATIENT
Start: 2025-05-26 | End: 2025-05-26

## 2025-05-26 RX ORDER — SODIUM CHLORIDE 0.9 % (FLUSH) 0.9 %
5-40 SYRINGE (ML) INJECTION EVERY 12 HOURS SCHEDULED
Status: DISCONTINUED | OUTPATIENT
Start: 2025-05-26 | End: 2025-05-28 | Stop reason: HOSPADM

## 2025-05-26 RX ORDER — LABETALOL HYDROCHLORIDE 5 MG/ML
10 INJECTION, SOLUTION INTRAVENOUS EVERY 6 HOURS PRN
Status: DISCONTINUED | OUTPATIENT
Start: 2025-05-26 | End: 2025-05-28 | Stop reason: HOSPADM

## 2025-05-26 RX ORDER — MAGNESIUM SULFATE IN WATER 40 MG/ML
2000 INJECTION, SOLUTION INTRAVENOUS PRN
Status: DISCONTINUED | OUTPATIENT
Start: 2025-05-26 | End: 2025-05-28 | Stop reason: HOSPADM

## 2025-05-26 RX ORDER — POTASSIUM CHLORIDE 1500 MG/1
40 TABLET, EXTENDED RELEASE ORAL PRN
Status: ACTIVE | OUTPATIENT
Start: 2025-05-26 | End: 2025-05-27

## 2025-05-26 RX ORDER — ACETAMINOPHEN 325 MG/1
650 TABLET ORAL EVERY 6 HOURS PRN
Status: DISCONTINUED | OUTPATIENT
Start: 2025-05-26 | End: 2025-05-28 | Stop reason: HOSPADM

## 2025-05-26 RX ORDER — ACETAMINOPHEN 650 MG/1
650 SUPPOSITORY RECTAL EVERY 6 HOURS PRN
Status: DISCONTINUED | OUTPATIENT
Start: 2025-05-26 | End: 2025-05-28 | Stop reason: HOSPADM

## 2025-05-26 RX ORDER — POTASSIUM CHLORIDE 7.45 MG/ML
10 INJECTION INTRAVENOUS PRN
Status: ACTIVE | OUTPATIENT
Start: 2025-05-26 | End: 2025-05-27

## 2025-05-26 RX ORDER — DIGOXIN 125 MCG
125 TABLET ORAL DAILY
Status: DISCONTINUED | OUTPATIENT
Start: 2025-05-27 | End: 2025-05-27

## 2025-05-26 RX ORDER — SODIUM CHLORIDE 9 MG/ML
50 INJECTION, SOLUTION INTRAVENOUS ONCE
Status: COMPLETED | OUTPATIENT
Start: 2025-05-26 | End: 2025-05-26

## 2025-05-26 RX ORDER — LIDOCAINE HYDROCHLORIDE AND EPINEPHRINE 10; 10 MG/ML; UG/ML
20 INJECTION, SOLUTION INFILTRATION; PERINEURAL ONCE
Status: COMPLETED | OUTPATIENT
Start: 2025-05-26 | End: 2025-05-26

## 2025-05-26 RX ORDER — ACETAMINOPHEN 500 MG
1000 TABLET ORAL ONCE
Status: COMPLETED | OUTPATIENT
Start: 2025-05-26 | End: 2025-05-26

## 2025-05-26 RX ORDER — ONDANSETRON 2 MG/ML
4 INJECTION INTRAMUSCULAR; INTRAVENOUS EVERY 6 HOURS PRN
Status: DISCONTINUED | OUTPATIENT
Start: 2025-05-26 | End: 2025-05-28 | Stop reason: HOSPADM

## 2025-05-26 RX ORDER — ONDANSETRON 4 MG/1
4 TABLET, ORALLY DISINTEGRATING ORAL EVERY 8 HOURS PRN
Status: DISCONTINUED | OUTPATIENT
Start: 2025-05-26 | End: 2025-05-28 | Stop reason: HOSPADM

## 2025-05-26 RX ADMIN — ONDANSETRON 4 MG: 2 INJECTION, SOLUTION INTRAMUSCULAR; INTRAVENOUS at 15:01

## 2025-05-26 RX ADMIN — HYDRALAZINE HYDROCHLORIDE 5 MG: 20 INJECTION INTRAMUSCULAR; INTRAVENOUS at 16:34

## 2025-05-26 RX ADMIN — LABETALOL HYDROCHLORIDE 10 MG: 5 INJECTION, SOLUTION INTRAVENOUS at 17:23

## 2025-05-26 RX ADMIN — PROTHROMBIN COMPLEX CONCENTRATE (HUMAN) 2000 UNITS: 25.5; 16.5; 24; 22; 22; 26 POWDER, FOR SOLUTION INTRAVENOUS at 15:48

## 2025-05-26 RX ADMIN — ACETAMINOPHEN 1000 MG: 500 TABLET ORAL at 18:10

## 2025-05-26 RX ADMIN — LIDOCAINE HYDROCHLORIDE,EPINEPHRINE BITARTRATE 20 ML: 10; .01 INJECTION, SOLUTION INFILTRATION; PERINEURAL at 14:19

## 2025-05-26 RX ADMIN — PROCHLORPERAZINE EDISYLATE 10 MG: 5 INJECTION INTRAMUSCULAR; INTRAVENOUS at 18:10

## 2025-05-26 RX ADMIN — SODIUM CHLORIDE 50 ML: 0.9 INJECTION, SOLUTION INTRAVENOUS at 16:05

## 2025-05-26 RX ADMIN — SODIUM CHLORIDE, PRESERVATIVE FREE 10 ML: 5 INJECTION INTRAVENOUS at 20:37

## 2025-05-26 RX ADMIN — TETANUS TOXOID, REDUCED DIPHTHERIA TOXOID AND ACELLULAR PERTUSSIS VACCINE, ADSORBED 0.5 ML: 5; 2.5; 8; 8; 2.5 SUSPENSION INTRAMUSCULAR at 14:18

## 2025-05-26 ASSESSMENT — PAIN DESCRIPTION - LOCATION
LOCATION: HEAD
LOCATION: HEAD

## 2025-05-26 ASSESSMENT — PAIN DESCRIPTION - FREQUENCY
FREQUENCY: CONTINUOUS
FREQUENCY: CONTINUOUS

## 2025-05-26 ASSESSMENT — PAIN DESCRIPTION - DESCRIPTORS
DESCRIPTORS: POUNDING
DESCRIPTORS: POUNDING

## 2025-05-26 ASSESSMENT — PAIN SCALES - GENERAL
PAINLEVEL_OUTOF10: 8
PAINLEVEL_OUTOF10: 8

## 2025-05-26 ASSESSMENT — PAIN DESCRIPTION - ONSET: ONSET: ON-GOING

## 2025-05-26 ASSESSMENT — PAIN DESCRIPTION - ORIENTATION
ORIENTATION: POSTERIOR
ORIENTATION: POSTERIOR

## 2025-05-26 ASSESSMENT — LIFESTYLE VARIABLES
HOW MANY STANDARD DRINKS CONTAINING ALCOHOL DO YOU HAVE ON A TYPICAL DAY: 1 OR 2
HOW OFTEN DO YOU HAVE A DRINK CONTAINING ALCOHOL: 2-4 TIMES A MONTH

## 2025-05-26 ASSESSMENT — PAIN DESCRIPTION - PAIN TYPE
TYPE: ACUTE PAIN
TYPE: ACUTE PAIN

## 2025-05-26 ASSESSMENT — PAIN - FUNCTIONAL ASSESSMENT
PAIN_FUNCTIONAL_ASSESSMENT: PREVENTS OR INTERFERES SOME ACTIVE ACTIVITIES AND ADLS
PAIN_FUNCTIONAL_ASSESSMENT: 0-10
PAIN_FUNCTIONAL_ASSESSMENT: ACTIVITIES ARE NOT PREVENTED

## 2025-05-26 NOTE — ED PROVIDER NOTES
THE [facility]  EMERGENCY DEPARTMENT ENCOUNTER          EM RESIDENT NOTE       Date of evaluation: 5/26/2025    Chief Complaint     Fall (Pt presents to the ED for a fall across the street in [employer]'s parking lot. Pt reports falling over and hitting back of head on the conrete and is now feeling dizzy and nauseous. Pt reports she thinks she takes eliquis. )      History of Present Illness     Seema Simpson is a 89 y.o. female with h/o A-fib on Xarelto, stroke on Plavix, aspirin who presents after fall.  Patient felt lightheaded in parking lot and fell backwards on concrete.  No loss of consciousness.  No vomiting.  Last dose of Xarelto this morning.      Physical Exam     INITIAL VITALS: BP: (!) 167/127, Temp: 97.5 °F (36.4 °C), Pulse: 83, Respirations: 22, SpO2: 93 %     General:  No acute distress. GCS 15.   HEENT: Head atraumatic. No hemotympanum or braswell sign. Pupils equal and reactive 4 --> 2. Conjunctiva clear. No blood in the nares or oropharynx.   Neck: Trachea midline.  Able to range neck fully.  Pulmonary:   Breathing comfortably. Breath sounds clear and equal bilaterally.  Chest:  Regular rate and rhythm. No murmurs. No chest wall tenderness or crepitus.   Abdomen:  Soft. Not tender to palpation, no rebound tenderness. Non-distended. No guarding. No pelvic instability.  Back: No step offs or deformities apparent of spine. No midline tenderness along the C, T, or L spine.   Extremities:  No long bone deformity or tenderness along the extremities.  Vascular:  Extremities warm and perfused.   Skin: 2 lacerations on posterior head, relatively hemostatic, 1 cm and 3 cm   Neuro:  Alert. No facial asymmetry. Moves all four extremities.   Psychiatric: Affect appropriate for situation.        MEDICAL DECISION MAKING / ED COURSE/ ASSESSMENT / PLAN     INITIAL VITALS: BP: (!) 167/127, Temp: 97.5 °F (36.4 °C), Pulse: 83, Respirations: 22, SpO2: 93 %    Seema Simpson is a 89 y.o. female on thinners

## 2025-05-26 NOTE — PROGRESS NOTES
4 Eyes Skin Assessment     NAME:  Seema Simpson  YOB: 1935  MEDICAL RECORD NUMBER:  [id number]    The patient is being assessed for  Admission    I agree that at least one RN has performed a thorough Head to Toe Skin Assessment on the patient. ALL assessment sites listed below have been assessed.      Areas assessed by both nurses:    Head, Face, Ears, Shoulders, Back, Chest, Arms, Elbows, Hands, Sacrum. Buttock, Coccyx, Ischium, Legs. Feet and Heels, and Under Medical Devices     -abrasion R elbow  -head laceration crown of head, staples in place  -scattered bruising       Does the Patient have a Wound? No noted wound(s)       Floyd Prevention initiated by RN: No  Wound Care Orders initiated by RN: No    Pressure Injury (Stage 3,4, Unstageable, DTI, NWPT, and Complex wounds) if present, place Wound referral order by RN under : No    New Ostomies, if present place, Ostomy referral order under : No     Nurse 1 eSignature: Electronically signed by Yudi Walsh RN on 5/26/25 at 6:24 PM EDT    **SHARE this note so that the co-signing nurse can place an eSignature**    Nurse 2 eSignature: Electronically signed by Dennise Judd RN on 5/26/25 at 6:41 PM EDT

## 2025-05-26 NOTE — ED PROVIDER NOTES
ED Attending Attestation Note     Date of evaluation: 5/26/2025    This patient was seen by the resident.  I have seen and examined the patient, agree with the workup, evaluation, management and diagnosis. The care plan has been discussed.  I have reviewed the ECG and concur with the resident's interpretation.  My assessment reveals patient with head lacerations after fall after becoming lightheaded.  Neuro exam is nonfocal.  Occipital scalp lacs present with active oozing.  Wound repair with staples performed by the resident and I was present for the procedure.  CT imaging revealed tSAH.  Neurosurgery consulted and patient will be admitted for further monitoring.  Due to the immediate potential for life-threatening deterioration due to tSAH on AC necessitating reversal with PCC and neurosurgical consultation as well as scalp lacs requiring repair, I spent 35 minutes providing critical care.  This time excludes time spent performing procedures but includes time spent on direct patient care, history retrieval, review of the chart, and discussions with patient, family, and consultant(s).       Lucio Villeda MD  05/26/25 0129

## 2025-05-26 NOTE — PROGRESS NOTES
Unable to complete med rec. Patient is able to state what her medications are for (states she takes blood pressure medication) but is unable to state names and dosages of medications. All other parts of admission assessment complete.

## 2025-05-26 NOTE — H&P
V2.0  History and Physical      Name:  Seema Simpson /Age/Sex: 1935  (89 y.o. female)   MRN & CSN:  [id number] & [id number] Encounter Date/Time: 2025 3:39 PM EDT   Location:  [place] [address] PCP: Chintan Muniz MD       Hospital Day: 1    Assessment and Plan:   Seema Simpson is a 89 y.o. female with a pmh of CAD, essential hypertension, hyperlipidemia, paroxysmal A-fib, on chronic anticoagulation, prior stroke who presents with complaints of nausea and dizziness after a fall    Hospital Problems           Last Modified POA    * (Principal) Subarachnoid hemorrhage (HCC) 2025 Yes   #Fall with no loss of consciousness  #SAH + SDH  #Lacerations x 2 on posterior head (1 cm and 3 cm), status post tetanus vaccine and repair of the 2 lacerations in ED  - Patient is on Xarelto and DAPT  - GCS = 15  - Status post prothrombin complex to reverse Xarelto effect    #Essential hypertension    #Chronic medical conditions as mentioned in PMH    Plan:  Admit to intermediate care unit, per neurosurgery recommendations  Will get CT of the cervical spine without contrast given history of striking the back of the head  Neurochecks every 4 hourly  Patient received prothrombin complex for reversal of Xarelto effect  2 lacerations, status post 3 as needed tetanus toxoid.  Continue wound care  Maintain systolic blood pressure less than 160  Hold aspirin, Plavix and Xarelto  Continue home doses of Toprol-XL and amlodipine  IV antihypertensives ordered to maintain systolic blood pressure less than 160  Repeat CT head in 6 hours (8 PM today) or stat if changes in mental status  Neurosurgery consulted  Fall precautions  PT/OT  Follow UA reflex to culture, monitor CBC  DVT prophylaxis with SCDs  Supportive therapy    Disposition:   Current Living situation: Home  Expected Disposition: Home  Estimated D/C: 2 days    Diet N.p.o. until repeat CT head   DVT Prophylaxis [] Lovenox, []  Heparin, [x] SCDs, []  Smoking status: Former     Passive exposure: Past    Smokeless tobacco: Never   Vaping Use    Vaping status: Never Used   Substance and Sexual Activity    Alcohol use: Yes     Alcohol/week: 1.0 standard drink of alcohol     Types: 1 Glasses of wine per week     Comment: 1 - 2 X's weekly    Drug use: Never     Social Drivers of Health     Food Insecurity: Not At Risk (2/22/2025)    [facility]    Food Insecurities     Within the past 12 months, did you worry that your food would run out before you got money to buy more?: No     Within the past 12 months, did the food you bought just not last and you didn't have money to get more?: No   Transportation Needs: Not At Risk (2/22/2025)    Received from [facility] and [facility]    Transportation     Within the past 12 months, has a lack of transportation kept you from medical appointments or from doing things needed for daily living?: No   Intimate Partner Violence: Not At Risk (2/22/2025)    Received from [facility]    Interpersonal Safety     Do you feel physically or emotionally unsafe where you currently live?: No     Within the past 12 months, have you been hit, slapped, kicked or otherwise physically hurt by anyone? : No     Within the past 12 months, have you been humiliated or emotionally abused by anyone? : No   Housing Stability: Not At Risk (2/22/2025)    Received from [facility] and [facility]    Housing/Utilities     Are you worried about losing your housing? : No     Within the past 12 months, have you ever stayed: outside, in a car, in a tent, in an overnight shelter, or temporarily in someone else's home (i.e. couch-surfing)?: No     Within the past 12 months, have you been unable to get utilities (heat, electricity) when it was really needed?: No       Medications:   Medications:    prothrombin complex human-lans  2,000 Units IntraVENous Once

## 2025-05-26 NOTE — PROGRESS NOTES
Patient admitted to 5515 from the ER. A/Ox4, oriented to room and surroundings. VSS, BP elevated in the 170s. Patient endorsing 8/10 pounding headache and nausea with emesis.   Compazine and Tylenol administered per the MAR.     4 eyes skin assessment completed with charge RN. Laceration to posterior head, abrasion to right elbow, and scattered ecchymosis noted.   Fall precautions in place. Patient educated on bed alarm and call light. Plan of care continues.

## 2025-05-26 NOTE — PROCEDURES
PROCEDURE NOTE  Date: 5/26/2025   Name: Seema Simpson  YOB: 1935    Lac Repair    Date/Time: 5/26/2025 3:22 PM    Performed by: Eloisa Barrientos MD  Authorized by: Lcuio Villeda MD    Consent:     Consent obtained:  Verbal  Anesthesia:     Anesthesia method:  Local infiltration    Local anesthetic:  Lidocaine 1% WITH epi  Laceration details:     Location:  Scalp    Scalp location:  Occipital    Length (cm):  4  Exploration:     Hemostasis achieved with:  Direct pressure    Imaging outcome: foreign body not noted    Treatment:     Area cleansed with:  Chlorhexidine and saline    Amount of cleaning:  Standard    Irrigation solution:  Sterile saline  Skin repair:     Repair method:  Staples    Number of staples:  7  Approximation:     Approximation:  Close  Repair type:     Repair type:  Simple  Post-procedure details:     Procedure completion:  Tolerated well, no immediate complications

## 2025-05-27 ENCOUNTER — APPOINTMENT (OUTPATIENT)
Dept: CT IMAGING | Age: 89
DRG: 087 | End: 2025-05-27
Payer: MEDICARE

## 2025-05-27 LAB
BACTERIA URNS QL MICRO: ABNORMAL /HPF
BASOPHILS # BLD: 0 K/UL (ref 0–0.2)
BASOPHILS NFR BLD: 0.2 %
BILIRUB UR QL STRIP.AUTO: NEGATIVE
CLARITY UR: CLEAR
COLOR UR: YELLOW
DEPRECATED RDW RBC AUTO: 14 % (ref 12.4–15.4)
EOSINOPHIL # BLD: 0 K/UL (ref 0–0.6)
EOSINOPHIL NFR BLD: 0 %
EPI CELLS #/AREA URNS HPF: ABNORMAL /HPF (ref 0–5)
GLUCOSE UR STRIP.AUTO-MCNC: NEGATIVE MG/DL
HCT VFR BLD AUTO: 46.6 % (ref 36–48)
HGB BLD-MCNC: 16.4 G/DL (ref 12–16)
HGB UR QL STRIP.AUTO: NEGATIVE
HYALINE CASTS #/AREA URNS LPF: ABNORMAL /LPF (ref 0–2)
KETONES UR STRIP.AUTO-MCNC: ABNORMAL MG/DL
LEUKOCYTE ESTERASE UR QL STRIP.AUTO: NEGATIVE
LYMPHOCYTES # BLD: 1.1 K/UL (ref 1–5.1)
LYMPHOCYTES NFR BLD: 8.3 %
MCH RBC QN AUTO: 34.7 PG (ref 26–34)
MCHC RBC AUTO-ENTMCNC: 35.3 G/DL (ref 31–36)
MCV RBC AUTO: 98.2 FL (ref 80–100)
MONOCYTES # BLD: 0.9 K/UL (ref 0–1.3)
MONOCYTES NFR BLD: 6.7 %
NEUTROPHILS # BLD: 11.2 K/UL (ref 1.7–7.7)
NEUTROPHILS NFR BLD: 84.8 %
NITRITE UR QL STRIP.AUTO: NEGATIVE
PH UR STRIP.AUTO: 6 [PH] (ref 5–8)
PLATELET # BLD AUTO: 209 K/UL (ref 135–450)
PMV BLD AUTO: 9.2 FL (ref 5–10.5)
PROT UR STRIP.AUTO-MCNC: 30 MG/DL
RBC # BLD AUTO: 4.74 M/UL (ref 4–5.2)
RBC #/AREA URNS HPF: ABNORMAL /HPF (ref 0–4)
SP GR UR STRIP.AUTO: >=1.03 (ref 1–1.03)
UA COMPLETE W REFLEX CULTURE PNL UR: ABNORMAL
UA DIPSTICK W REFLEX MICRO PNL UR: YES
URN SPEC COLLECT METH UR: ABNORMAL
UROBILINOGEN UR STRIP-ACNC: 0.2 E.U./DL
WBC # BLD AUTO: 13.3 K/UL (ref 4–11)
WBC #/AREA URNS HPF: ABNORMAL /HPF (ref 0–5)

## 2025-05-27 PROCEDURE — 6370000000 HC RX 637 (ALT 250 FOR IP): Performed by: INTERNAL MEDICINE

## 2025-05-27 PROCEDURE — 6370000000 HC RX 637 (ALT 250 FOR IP)

## 2025-05-27 PROCEDURE — 2060000000 HC ICU INTERMEDIATE R&B

## 2025-05-27 PROCEDURE — 70450 CT HEAD/BRAIN W/O DYE: CPT

## 2025-05-27 PROCEDURE — 36415 COLL VENOUS BLD VENIPUNCTURE: CPT

## 2025-05-27 PROCEDURE — 2500000003 HC RX 250 WO HCPCS: Performed by: INTERNAL MEDICINE

## 2025-05-27 PROCEDURE — 85025 COMPLETE CBC W/AUTO DIFF WBC: CPT

## 2025-05-27 PROCEDURE — APPNB180 APP NON BILLABLE TIME > 60 MINS: Performed by: NURSE PRACTITIONER

## 2025-05-27 PROCEDURE — 81001 URINALYSIS AUTO W/SCOPE: CPT

## 2025-05-27 RX ORDER — SPIRONOLACTONE 25 MG/1
25 TABLET ORAL DAILY
COMMUNITY

## 2025-05-27 RX ORDER — FUROSEMIDE 40 MG/1
40 TABLET ORAL DAILY PRN
COMMUNITY

## 2025-05-27 RX ORDER — EZETIMIBE 10 MG/1
10 TABLET ORAL DAILY
COMMUNITY

## 2025-05-27 RX ORDER — EZETIMIBE 10 MG/1
10 TABLET ORAL DAILY
Status: DISCONTINUED | OUTPATIENT
Start: 2025-05-27 | End: 2025-05-28 | Stop reason: HOSPADM

## 2025-05-27 RX ORDER — FUROSEMIDE 40 MG/1
40 TABLET ORAL DAILY PRN
Status: DISCONTINUED | OUTPATIENT
Start: 2025-05-27 | End: 2025-05-28 | Stop reason: HOSPADM

## 2025-05-27 RX ORDER — SPIRONOLACTONE 25 MG/1
25 TABLET ORAL DAILY
Status: DISCONTINUED | OUTPATIENT
Start: 2025-05-27 | End: 2025-05-28 | Stop reason: HOSPADM

## 2025-05-27 RX ADMIN — ACETAMINOPHEN 650 MG: 325 TABLET ORAL at 10:15

## 2025-05-27 RX ADMIN — SODIUM CHLORIDE, PRESERVATIVE FREE 10 ML: 5 INJECTION INTRAVENOUS at 21:10

## 2025-05-27 RX ADMIN — ACETAMINOPHEN 650 MG: 325 TABLET ORAL at 17:03

## 2025-05-27 RX ADMIN — EZETIMIBE 10 MG: 10 TABLET ORAL at 13:09

## 2025-05-27 RX ADMIN — SPIRONOLACTONE 25 MG: 25 TABLET ORAL at 13:09

## 2025-05-27 ASSESSMENT — PAIN SCALES - GENERAL
PAINLEVEL_OUTOF10: 5
PAINLEVEL_OUTOF10: 4
PAINLEVEL_OUTOF10: 3
PAINLEVEL_OUTOF10: 5

## 2025-05-27 ASSESSMENT — PAIN - FUNCTIONAL ASSESSMENT
PAIN_FUNCTIONAL_ASSESSMENT: ACTIVITIES ARE NOT PREVENTED
PAIN_FUNCTIONAL_ASSESSMENT: ACTIVITIES ARE NOT PREVENTED

## 2025-05-27 ASSESSMENT — PAIN DESCRIPTION - LOCATION
LOCATION: HEAD
LOCATION: HEAD

## 2025-05-27 ASSESSMENT — PAIN DESCRIPTION - ORIENTATION
ORIENTATION: MID;POSTERIOR
ORIENTATION: POSTERIOR;MID

## 2025-05-27 ASSESSMENT — PAIN DESCRIPTION - ONSET
ONSET: ON-GOING
ONSET: ON-GOING

## 2025-05-27 ASSESSMENT — PAIN DESCRIPTION - DESCRIPTORS
DESCRIPTORS: ACHING;CRUSHING
DESCRIPTORS: ACHING;DISCOMFORT

## 2025-05-27 ASSESSMENT — PAIN DESCRIPTION - FREQUENCY
FREQUENCY: CONTINUOUS
FREQUENCY: CONTINUOUS

## 2025-05-27 ASSESSMENT — PAIN DESCRIPTION - PAIN TYPE
TYPE: ACUTE PAIN
TYPE: ACUTE PAIN

## 2025-05-27 NOTE — PROGRESS NOTES
Clinical Pharmacy Note  Medication History     Admit Date: 5/26/2025       List of current medications patient is taking is complete. Home Medication list in EPIC updated to reflect changes noted below.    Source of Information:     Review of Rx dispense history (Surescripts-complete dispense report in Epic)    Review of med lists via Care Everywhere ([facility])  PCP and cardiology there, as well as recent admission in Feb 2025    Patient's home pharmacy: [id number] [address]  P [phone] - F [phone]      CHANGES TO HOME MEDICATION LIST:    REMOVED:  (no recent Rx fills, not on recent PCP visit list, or current [facility] list)  1) Amlodipine  2) ASA  3) Plavix  4) Digoxin  5) Metoprolol  6) Niacin  7) Xarelto  (takes Eliquis)    ADDED:  1) Spironolactone 25 mg daily  2) Eliquis 5 mg BID  3) Zetia 10 mg daily  4) Furosemide 40 mg daily PRN leg swelling    DOSE or FREQUENCY CHANGE:   none    OTHER NOTES:  1) Pt altered,  here but does not know medication names.  2) List updated based only on recent fill history which correlates well with med list at [facility], where pt sees PCP and cardiologist, and recent admission Feb 2025    Current Outpatient Medications   Medication Instructions    apixaban (ELIQUIS) 5 MG TABS tablet 2 TIMES DAILY    ezetimibe (ZETIA) 10 mg, DAILY    furosemide (LASIX) 40 mg, DAILY PRN    spironolactone (ALDACTONE) 25 mg, DAILY         Vanessa Ayers [facility] PharmD, [facility]   Inpatient pharmacy [id number]

## 2025-05-27 NOTE — PROGRESS NOTES
[facility] Medicine Progress Note  V 5.17      Date of Admission: 5/26/2025    Hospital Day: 2      Chief Admission Complaint:  Fall    Subjective:  Patient seen and examined at bedside. No acute events overnight. Patient reports feeling much better today, though is still not quite back to her baseline. Denies any acute concerns at this time.     Presenting Admission History:       Seema Simpson is a 89 y.o. female with pmh as mentioned above presents with complaints of dizziness and nausea after a fall earlier today.  Patient states that she sustained a [place] [employer] [place], falling over and hitting the back of the head on the concrete.  Denies loss of consciousness.  Patient is on aspirin, Plavix and Xarelto.  Last dose of Xarelto was this morning  Denies headache, changes in vision, vomiting, focal motor or sensory deficits     Patient denies fever, chills, URI symptoms, cough, SOB, chest or abdominal pain, urinary symptoms, changes in bowel habits, oral intake    Assessment/Plan:      Fall with no loss of consciousness  SAH + SDH  Lacerations x 2 on posterior head (1 cm and 3 cm), status post tetanus vaccine and repair of the 2 lacerations in ED  - Patient is on Eliquis at home  - GCS = 15  - Status post prothrombin complex to reverse Eliquis effect  - Neurosurgery consulted, appreciate recommendations. Q4H neurochecks  - Goal SBP <160. Hold antiplatelet/anticoagulation for 14 days. Keep platelets >100k. SCDs for DVT ppx until clear per neurosurgery  - PT/OT/SLP.   - Repeat CT head stable compared to prior. Clear for discharge per neurosurgery  - Pending PT/OT evals. Per pharm med rec, and patient, she takes Eliquis at home BID, not Xarelto. Also does not appear to take Plavix anymore. Patient did state she takes a baby aspirin as well.      Essential hypertension  - Continue home Toprol XL, amlodipine. IV PRN antihypertensives for SBP >160     Chronic medical conditions as mentioned in  PMH    Consults:      IP CONSULT TO NEUROSURGERY  IP CONSULT TO NEUROSURGERY        --------------------------------------------------      Medications:        Infusion Medications    sodium chloride       Scheduled Medications    amLODIPine  10 mg Oral Daily    digoxin  125 mcg Oral Daily    metoprolol succinate  25 mg Oral Daily    sodium chloride flush  5-40 mL IntraVENous 2 times per day     PRN Meds: sodium chloride flush, sodium chloride, potassium chloride **OR** potassium alternative oral replacement **OR** potassium chloride, magnesium sulfate, ondansetron **OR** ondansetron, polyethylene glycol, acetaminophen **OR** acetaminophen, hydrALAZINE, labetalol      Physical Exam Performed:      General appearance:  No apparent distress  Respiratory:  Normal respiratory effort without tachypnea.   Cardiovascular:  Regular Rate w/ Regular rhythm.  Abdomen:  Soft, non-tender, non-distended. Bowel sounds normal  Musculoskeletal:  No edema  Neurologic:  Neurovascularly intact without focal sensory/motor deficits.  Psychiatric:  Alert and oriented    BP (!) 143/75   Pulse 92   Temp 98.4 °F (36.9 °C) (Oral)   Resp 18   Ht 1.676 m (5' 6\")   Wt 81.1 kg (178 lb 11.2 oz)   SpO2 98%   BMI 28.84 kg/m²     Telemetry:      Personally reviewed and interpreted telemetry (Rhythm Strip) on 5/27/2025.  Patient is currently NOT ON tele.    Diet: ADULT DIET; Regular    DVT Prophylaxis: IPC/SCDs    Code status: Full Code    PT/OT Eval Status: Ordered and pending    Multi-Disciplinary Rounds with Case Management completed on 5/27/2025 with the following recs:     Anticipated Discharge Location: [facility]     Anticipated Discharge Day/Date:  1-2 days    Barriers to Discharge: Subspecialty recs and PT/OT eval/recs    --------------------------------------------------    MDM (any 2 required for High level billing)    A. Problems (any 1)  [] Acute/Chronic Illness/injury posing ongoing threat to life and/or bodily function without ongoing

## 2025-05-27 NOTE — PROGRESS NOTES
PATIENT INSTRUCTIONS    Treatment:  Discontinue antibiotics due to GI Issue.    Being taking Doxycyline next week    Follow-Up:  Please make an appointment with  Dr. Ghanshyam Dominguez in 3 months            Time left: 5/25/2022 8:20 AM     Next appointment:    Location:        Provider:         Please note: 24 hour notice for cancellation of appointment is required.    You may receive a survey in the mail, or via the e-mail address that you have provided.  We would appreciate if you could fill out the survey and provide us with any feedback on your experience regarding your visit today. Thank you for allowing us to provide you with your health care needs.     Do not hesitate to call if you are experiencing severe pain, worsening or change in your pain, have symptoms of infection (fever, warmth, redness, increased drainage), or have any other problem that concerns you ~ [phone] (or [phone] after hours).    Please remember when requesting refills on pain medication that the request should be made by Thursday at the latest. [facility] is open Monday-Friday, 8am-5pm, and closed on the weekends.  No narcotic refills will be filled after hours.    Additional Educational Resources:  For additional resources regarding your symptoms, diagnosis, or further health information, please visit the Health Resources section on [email or web address] or the Online Health Resources section in [employer].       Patient alert and oriented x3, VSS, RA. Ambulates x 1 assist GB/walker, tolerates poorly. Voiding adequately via bedside commode. Tolerating PO. Pain managed via MAR and non-pharm measures. All safety precautions in place, call light/items in reach.

## 2025-05-27 NOTE — PLAN OF CARE
Brief note.  Patient seen and examined.    HPI:    Seema Simpson is an 89-year-old female with a history of atrial fibrillation, CAD, hyperlipidemia, hypertension, and recent stroke (2/20/2025), who presents after a mechanical fall in a [employer] parking lot earlier today. She reports tripping and striking the back of her head on concrete. She denies loss of consciousness. Initial symptoms included dizziness and nausea, which had resolved by the time of evaluation. Denies headache, vomiting, visual changes, or focal motor/sensory deficits.Medications include aspirin, Plavix, and Xarelto (last dose this morning).   Head CT showed Scattered extra-axial hyperdensities consistent with subarachnoid hemorrhage, most prominent in the inferior left frontal region, with possible small subdural component. No significant mass effect. She had a posterior right scalp laceration which was repaired. She was given PCC for anticoagulation reversal and admitted for further observation. 6 Hour repeat head CT revealed Slight increase in anterior extra-axial hemorrhage in the left anterior frontal region. No midline shift or significant mass effect. Tiny foci of SAH in right frontal and left lateral temporal lobes are less prominent. Chronic cerebral atrophy and small vessel ischemic changes noted. Exam remains stable. GCS 15 and NIH 0.    PHYSICAL EXAM:  Vitals:    05/26/25 1720 05/26/25 1730 05/26/25 1810 05/26/25 2115   BP: (!) 189/93 (!) 158/97 (!) 170/91 (!) 126/101   Pulse: 90 82 73 90   Resp: 27 28 20 20   Temp: 97.6 °F (36.4 °C)  97.9 °F (36.6 °C) 98.1 °F (36.7 °C)   TempSrc: Oral  Oral Axillary   SpO2: 90% 93% 96% 94%   Weight:       Height:             General Alert, no distress, well-nourished    Neurologic  Mental status:   Orientation to person, place, time, situation  Attention intact as able to attend well to the exam    Language fluent in conversation  Comprehension intact; follows simple commands    Cranial nerves:

## 2025-05-27 NOTE — DISCHARGE INSTRUCTIONS
Do no take your blood thinners for 2 weeks (this includes aspirin).   You should follow up with your primary care physician in 2 weeks before you restart your blood thinners.    Please follow up with your primary care physician within 1-2 week following discharge.     Please return to the hospital with any new or worsening symptoms.

## 2025-05-27 NOTE — PLAN OF CARE
Problem: Chronic Conditions and Co-morbidities  Goal: Patient's chronic conditions and co-morbidity symptoms are monitored and maintained or improved  5/27/2025 1025 by Gill Georges RN  Outcome: Progressing   Reinforcement of disease process and treatment plan recommended for chronic conditions and co-morbidities.      Problem: Discharge Planning  Goal: Discharge to home or other facility with appropriate resources  5/27/2025 1025 by Gill Georges, RN  Outcome: Progressing  Patient actively participates in ADL's and decision making regarding plan of care.     Problem: Pain  Goal: Verbalizes/displays adequate comfort level or baseline comfort level  5/27/2025 1025 by Gill Georges, RN  Outcome: Progressing  No new signs/symptoms of pain noted, pain rating < 3 on scale 0-10, pain controlled with medication/repositioning.     Problem: Skin/Tissue Integrity  Goal: Skin integrity remains intact  Description: 1.  Monitor for areas of redness and/or skin breakdown2.  Assess vascular access sites hourly3.  Every 4-6 hours minimum:  Change oxygen saturation probe site4.  Every 4-6 hours:  If on nasal continuous positive airway pressure, respiratory therapy assess nares and determine need for appliance change or resting period  5/27/2025 1025 by Gill Georges, RN  Outcome: Progressing  No new skin breakdown noted, no new signs/symptoms of infection, continue to monitor labwork including WBC, medications administered per physician orders.     Problem: Safety - Adult  Goal: Free from fall injury  5/27/2025 1025 by Gill Georges, RN  Outcome: Progressing  No falls/injuries this shift, bed in lowest position, brakes on, bed alarm on, call light in reach, side rails up x2.     Problem: ABCDS Injury Assessment  Goal: Absence of physical injury  5/27/2025 1025 by Gill Georges, RN  Outcome: Progressing     Problem: Neurosensory - Adult  Goal: Achieves stable or improved neurological status  5/27/2025 1025 by Gill Georges, RN  Outcome: Progressing

## 2025-05-27 NOTE — PLAN OF CARE
Problem: Chronic Conditions and Co-morbidities  Goal: Patient's chronic conditions and co-morbidity symptoms are monitored and maintained or improved  Outcome: Progressing     Problem: Discharge Planning  Goal: Discharge to home or other facility with appropriate resources  Outcome: Progressing     Problem: Pain  Goal: Verbalizes/displays adequate comfort level or baseline comfort level  Outcome: Progressing  Note: No c/o pain at this time, will continue to monitor and give interventions as indicated       Problem: Skin/Tissue Integrity  Goal: Skin integrity remains intact  Description: 1.  Monitor for areas of redness and/or skin breakdown2.  Assess vascular access sites hourly3.  Every 4-6 hours minimum:  Change oxygen saturation probe site4.  Every 4-6 hours:  If on nasal continuous positive airway pressure, respiratory therapy assess nares and determine need for appliance change or resting period  Outcome: Progressing  Flowsheets (Taken 5/26/2025 2115)  Skin Integrity Remains Intact: Monitor for areas of redness and/or skin breakdown     Problem: Safety - Adult  Goal: Free from fall injury  Outcome: Progressing  Flowsheets (Taken 5/26/2025 2115)  Free From Fall Injury: Instruct family/caregiver on patient safety     Problem: ABCDS Injury Assessment  Goal: Absence of physical injury  Outcome: Progressing  Flowsheets (Taken 5/26/2025 2115)  Absence of Physical Injury: Implement safety measures based on patient assessment     Problem: Neurosensory - Adult  Goal: Achieves stable or improved neurological status  Outcome: Progressing  Goal: Achieves maximal functionality and self care  Outcome: Progressing  Note: No new neuro changes noted

## 2025-05-27 NOTE — CONSULTS
NEUROSURGERY CONSULT NOTE    SEEMA COLLADO  [id number]   1935 5/27/2025    Requesting physician: Irvin Pino MD    Reason for consultation: sdh and tsah    History of present illness: Seema Collado is an 89-year-old female with a history of atrial fibrillation, CAD, hyperlipidemia, hypertension, and recent stroke (2/20/2025), who presents after a mechanical fall in a [employer] parking lot earlier today. She reports tripping and striking the back of her head on concrete. She denies loss of consciousness. Initial symptoms included dizziness and nausea, which had resolved by the time of evaluation. Denies headache, vomiting, visual changes, or focal motor/sensory deficits.Medications include eliquis   Head CT showed Scattered extra-axial hyperdensities consistent with subarachnoid hemorrhage, most prominent in the inferior left frontal region, with possible small subdural component. No significant mass effect. She had a posterior right scalp laceration which was repaired. She was given PCC for anticoagulation reversal and admitted for further observation. 6 Hour repeat head CT revealed Slight increase in anterior extra-axial hemorrhage in the left anterior frontal region. No midline shift or significant mass effect. Tiny foci of SAH in right frontal and left lateral temporal lobes are less prominent. Chronic cerebral atrophy and small vessel ischemic changes noted. Exam remains stable. GCS 15 . Another CT head was done and it was stable.  Pharmacy found that she has been on eliquis. Pt keeps saying she is on xarelto, asa and plavix. Pt confused as to meds and  stated he did not know her medications.   ROS:   GENERAL:  Denies fever or recent illness. Denies weight changes   EYES:  Denies vision change or diplopia  EARS:  Denies hearing loss  CARDIAC:  Denies chest pain  RESPIRATORY:  Denies shortness of breath  SKIN:  Denies rash or lesions   HEM:  Denies excessive  bruising  PSYCH:  Denies anxiety or depression  NEURO:  + headache, numbness or tingling or lateralizing weakness   :  Denies urinary difficulty  GI: Denies nausea, vomiting, diarrhea or constipation  MUSCULOSKELETAL:  No arthralgias    Allergies   Allergen Reactions    Benazepril Cough    Statins     Sulfa Antibiotics        Past Medical History:   Diagnosis Date    Atrial fibrillation (HCC)     Back pain     CAD (coronary artery disease)     Cancer (HCC)     squamous cell carcinoma     Hyperlipidemia     Hypertension     Stroke (HCC) 02/20/2025        Past Surgical History:   Procedure Laterality Date    HYSTERECTOMY (CERVIX STATUS UNKNOWN)      MOHS SURGERY Right 11/06/2023    RT nasal tip- [facility]    OVARY REMOVAL         Social History     Occupational History    Not on file   Tobacco Use    Smoking status: Former     Passive exposure: Past    Smokeless tobacco: Never   Vaping Use    Vaping status: Never Used   Substance and Sexual Activity    Alcohol use: Yes     Alcohol/week: 1.0 standard drink of alcohol     Types: 1 Glasses of wine per week     Comment: 1 - 2 X's weekly    Drug use: Never    Sexual activity: Not on file        Family History   Problem Relation Age of Onset    Colon Cancer Paternal Aunt     Stroke Mother     Alcohol Abuse Father         Outpatient Medications Marked as Taking for the 5/26/25 encounter (Hospital Encounter)   Medication Sig Dispense Refill    spironolactone (ALDACTONE) 25 MG tablet Take 1 tablet by mouth daily      apixaban (ELIQUIS) 5 MG TABS tablet Take by mouth 2 times daily      ezetimibe (ZETIA) 10 MG tablet Take 1 tablet by mouth daily      furosemide (LASIX) 40 MG tablet Take 1 tablet by mouth daily as needed (leg swelling)          Current Facility-Administered Medications   Medication Dose Route Frequency Provider Last Rate Last Admin    ezetimibe (ZETIA) tablet 10 mg  10 mg Oral Daily Myke Jacobs DO        furosemide (LASIX) tablet 40 mg  40 mg Oral Daily PRN

## 2025-05-27 NOTE — CARE COORDINATION
Case Management Assessment  Initial Evaluation    Date/Time of Evaluation: 5/27/2025 1:41 PM  Assessment Completed by: Aracelis Cross RN    If patient is discharged prior to next notation, then this note serves as note for discharge by case management.    Patient Name: Seema Simpson                   YOB: 1935  Diagnosis: Subarachnoid hemorrhage (HCC) [I60.9]  Subarachnoid hemorrhage following injury, no loss of consciousness, initial encounter ([facility]) [S06.6X0A]  Fall, initial encounter [W19.XXXA]  Laceration of scalp, initial encounter [S01.01XA]                   Date / Time: 5/26/2025  1:00 PM    Patient Admission Status: Inpatient   Readmission Risk (Low < 19, Mod (19-27), High > 27): Readmission Risk Score: 11    Current PCP: Chintan Muniz MD  PCP verified by CM? Yes (Chintan Muniz MD)    Chart Reviewed: Yes      History Provided by: Patient  Patient Orientation: Alert and Oriented    Patient Cognition: Alert    Hospitalization in the last 30 days (Readmission):  No    If yes, Readmission Assessment in CM Navigator will be completed.    Advance Directives:      Code Status: Full Code   Patient's Primary Decision Maker is: Legal Next of Kin ()      Discharge Planning:    Patient lives with: Spouse/Significant Other Type of Home: Other (Comment) (Condo)  Primary Care Giver: Self  Patient Support Systems include: Spouse/Significant Other, Children, Family Members   Current Financial resources: Medicare ([facility])  Current community resources:    Current services prior to admission: None            Current DME:              Type of Home Care services:  None    ADLS  Prior functional level: Independent in ADLs/IADLs  Current functional level: Independent in ADLs/IADLs    PT AM-PAC:   /24  OT AM-PAC:   /24    Family can provide assistance at DC: Yes  Would you like Case Management to discuss the discharge plan with any other family members/significant others,  and if so, who? No  Plans to Return to Present Housing: Yes  Other Identified Issues/Barriers to RETURNING to current housing: na  Potential Assistance needed at discharge: N/A            Potential DME:    Patient expects to discharge to: Other (comment) (Condo)  Plan for transportation at discharge: Self    Financial    Payor: [employer] MEDICARE / Plan: [employer] MEDICARE / Product Type: *No Product type* /     Does insurance require precert for SNF: Yes    Potential assistance Purchasing Medications: No  Meds-to-Beds request:        [id number] - [place], [place] - [address] - P [phone] - F [phone]  [address] [place] [address]  Phone: [phone] Fax: [phone]      Notes:    Factors facilitating achievement of predicted outcomes: Family support and Pleasant    Barriers to discharge: Lower extremity weakness and Medical complications    Additional Case Management Notes: Patient here for fall in parking lot across the street from the hospital when she was walking to her husbands car. Hit head and is getting q 2 hour neuro checks and will be getting another head CT. CM awaiting PT/OT suggestions.     The Plan for Transition of Care is related to the following treatment goals of Subarachnoid hemorrhage (HCC) [I60.9]  Subarachnoid hemorrhage following injury, no loss of consciousness, initial encounter (HCC) [S06.6X0A]  Fall, initial encounter [W19.XXXA]  Laceration of scalp, initial encounter [S01.01XA]    IF APPLICABLE: The Patient and/or patient representative Seema and her family were provided with a choice of provider and agrees with the discharge plan. Freedom of choice list with basic dialogue that supports the patient's individualized plan of care/goals and shares the quality data associated with the providers was provided to: Patient   Patient Representative Name:       The Patient and/or Patient Representative Agree with the Discharge Plan? Yes    Aracelis

## 2025-05-28 VITALS
HEART RATE: 76 BPM | SYSTOLIC BLOOD PRESSURE: 109 MMHG | WEIGHT: 178.7 LBS | TEMPERATURE: 98.2 F | DIASTOLIC BLOOD PRESSURE: 93 MMHG | HEIGHT: 66 IN | BODY MASS INDEX: 28.72 KG/M2 | OXYGEN SATURATION: 92 % | RESPIRATION RATE: 16 BRPM

## 2025-05-28 PROCEDURE — 6370000000 HC RX 637 (ALT 250 FOR IP)

## 2025-05-28 PROCEDURE — 97166 OT EVAL MOD COMPLEX 45 MIN: CPT

## 2025-05-28 PROCEDURE — 2500000003 HC RX 250 WO HCPCS: Performed by: INTERNAL MEDICINE

## 2025-05-28 PROCEDURE — 97530 THERAPEUTIC ACTIVITIES: CPT

## 2025-05-28 PROCEDURE — 6370000000 HC RX 637 (ALT 250 FOR IP): Performed by: INTERNAL MEDICINE

## 2025-05-28 PROCEDURE — 97162 PT EVAL MOD COMPLEX 30 MIN: CPT

## 2025-05-28 PROCEDURE — 97535 SELF CARE MNGMENT TRAINING: CPT

## 2025-05-28 PROCEDURE — 97116 GAIT TRAINING THERAPY: CPT

## 2025-05-28 RX ADMIN — ACETAMINOPHEN 650 MG: 325 TABLET ORAL at 08:36

## 2025-05-28 RX ADMIN — EZETIMIBE 10 MG: 10 TABLET ORAL at 08:36

## 2025-05-28 RX ADMIN — SODIUM CHLORIDE, PRESERVATIVE FREE 10 ML: 5 INJECTION INTRAVENOUS at 08:37

## 2025-05-28 RX ADMIN — SPIRONOLACTONE 25 MG: 25 TABLET ORAL at 08:36

## 2025-05-28 ASSESSMENT — PAIN DESCRIPTION - LOCATION: LOCATION: HEAD

## 2025-05-28 ASSESSMENT — PAIN - FUNCTIONAL ASSESSMENT: PAIN_FUNCTIONAL_ASSESSMENT: ACTIVITIES ARE NOT PREVENTED

## 2025-05-28 ASSESSMENT — PAIN DESCRIPTION - PAIN TYPE: TYPE: ACUTE PAIN

## 2025-05-28 ASSESSMENT — PAIN SCALES - GENERAL
PAINLEVEL_OUTOF10: 2
PAINLEVEL_OUTOF10: 5

## 2025-05-28 ASSESSMENT — PAIN DESCRIPTION - ONSET: ONSET: ON-GOING

## 2025-05-28 ASSESSMENT — PAIN DESCRIPTION - FREQUENCY: FREQUENCY: CONTINUOUS

## 2025-05-28 ASSESSMENT — PAIN DESCRIPTION - ORIENTATION: ORIENTATION: MID

## 2025-05-28 ASSESSMENT — PAIN DESCRIPTION - DESCRIPTORS: DESCRIPTORS: DISCOMFORT

## 2025-05-28 NOTE — DISCHARGE INSTR - COC
Continuity of Care Form    Patient Name: Seema Simpson   :  1935  MRN:  [id number]    Admit date:  2025  Discharge date:  ***    Code Status Order: Full Code   Advance Directives:     Admitting Physician:  Irvin Pino MD  PCP: Chintan Muniz MD    Discharging Nurse: ***  Discharging Hospital Unit/Room#: 5515/5515-01  Discharging Unit Phone Number: ***    Emergency Contact:   Extended Emergency Contact Information  Primary Emergency Contact: Daniele Simpson  Address: [address] [place] [id number] [facility]  Home Phone: [phone]  Relation: Spouse  Secondary Emergency Contact: PepenbLucio damon   [facility]  Home Phone: [phone]  Relation: Child    Past Surgical History:  Past Surgical History:   Procedure Laterality Date    HYSTERECTOMY (CERVIX STATUS UNKNOWN)      MOHS SURGERY Right 2023    RT nasal tip- [facility]    OVARY REMOVAL         Immunization History:   Immunization History   Administered Date(s) Administered    COVID-19, PFIZER PURPLE top, DILUTE for use, (age 12 y+), 30mcg/0.3mL 2021, 2021, 10/11/2021    Influenza Vaccine, unspecified formulation 10/28/2014    Pneumococcal, PPSV23, PNEUMOVAX 23, (age 2y+), SC/IM, 0.5mL 2011    TDaP, ADACEL (age 10y-64y), BOOSTRIX (age 10y+), IM, 0.5mL 2025       Active Problems:  Patient Active Problem List   Diagnosis Code    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

## 2025-05-28 NOTE — DISCHARGE SUMMARY
V2.0  Discharge Summary    Name:  Seema Simpson /Age/Sex: 1935 (89 y.o. female)   Admit Date: 2025  Discharge Date: 25    MRN & CSN:  [id number] & [id number] Encounter Date and Time 25 6:04 PM EDT    Attending:  No att. providers found Discharging Provider: Myke Jacobs DO       Hospital Course:     Brief HPI: Seema Simpson is a 89 y.o. female who presented with complaints of dizziness and nausea after a fall earlier today.  Patient states that she sustained a fall in the [employer] parking lot, falling over and hitting the back of the head on the concrete.  Denies loss of consciousness.  Patient is on aspirin, Plavix and Xarelto.  Last dose of Xarelto was this morning  Denies headache, changes in vision, vomiting, focal motor or sensory deficits  Patient denies fever, chills, URI symptoms, cough, SOB, chest or abdominal pain, urinary symptoms, changes in bowel habits, oral intake    Brief Problem Based Course:     Fall with no loss of consciousness  SAH + SDH  Lacerations x 2 on posterior head (1 cm and 3 cm), status post tetanus vaccine and repair of the 2 lacerations in ED  - Patient is on Eliquis at home  - GCS = 15 on presentation  - Status post prothrombin complex to reverse Eliquis effect  - Neurosurgery consulted during admission  - Goal SBP <160. Hold antiplatelet/anticoagulation for 14 days. Keep platelets >100k.  - PT/OT/SLP evaluations while inpatient   - Repeat CT head stable compared to prior. Clear for discharge per neurosurgery  - PT/OT recommending SNF vs home with C/24 hr assist. Patient elected to go [facility] with [facility]. Patient is being discharged to home in stable condition. She was instructed to hold her anticoagulation until , at which point she may resume it. She does not need outpatient neurosurgical follow up but was instructed to follow up with her PCP     Essential hypertension  - Continue home Toprol XL, amlodipine    The patient expressed  appropriate understanding of, and agreement with the discharge recommendations, medications, and plan.     Consults this admission:  IP CONSULT TO NEUROSURGERY  IP CONSULT TO NEUROSURGERY  IP CONSULT TO HOME CARE NEEDS    Discharge Diagnosis:   Subarachnoid hemorrhage (HCC)  Subdural hematoma    Discharge Instruction:   Follow up appointments: PCP  Primary care physician: Chintan Muniz MD within 1 week  Diet: regular diet   Activity: activity as tolerated  Disposition: Discharged to:   [x]Home, [x]HHC, []SNF, []Acute Rehab, []Hospice   Condition on discharge: Stable  Labs and Tests to be Followed up as an outpatient by PCP or Specialist: none    Discharge Medications:        Medication List        PAUSE taking these medications      Eliquis 5 MG Tabs tablet  Wait to take this until: June 11, 2025  Generic drug: apixaban            CONTINUE taking these medications      ezetimibe 10 MG tablet  Commonly known as: ZETIA     furosemide 40 MG tablet  Commonly known as: LASIX     spironolactone 25 MG tablet  Commonly known as: ALDACTONE             Objective Findings at Discharge:   BP (!) 109/93   Pulse 76   Temp 98.2 °F (36.8 °C) (Oral)   Resp 16   Ht 1.676 m (5' 6\")   Wt 81.1 kg (178 lb 11.2 oz)   SpO2 92%   BMI 28.84 kg/m²       Physical Exam:     General: NAD  Eyes: EOMI  ENT: neck supple  Cardiovascular: Regular rate.  Respiratory: Clear to auscultation  Gastrointestinal: Soft, non tender  Genitourinary: no suprapubic tenderness  Musculoskeletal: No edema  Skin: warm, dry  Neuro: Alert.  Psych: Mood appropriate.         Labs and Imaging   CT HEAD WO CONTRAST  Result Date: 5/27/2025  EXAM: CT HEAD WO CONTRAST INDICATION: f/u SAH TECHNIQUE: Axial thin section CT images of the head were obtained without contrast. Sagittal and coronal 2-D multiplanar reconstructions were performed at the scanner.  Up-to-date CT equipment and radiation dose reduction techniques were employed. COMPARISON: 6 hours prior

## 2025-05-28 NOTE — PROGRESS NOTES
Discharge instructions reviewed with patient and patient's spouse, all questions answered. PIV removed x 2. Patient discharged with all belongings via private vehicle, driven by .

## 2025-05-28 NOTE — PROGRESS NOTES
VSS, afebrile. Alert and oriented with intermittent bouts of confusions/forgetfulness. No c/o pain at this time, will continue to monitor and give interventions as indicated. Up x1 with GB/walker. All fall & safety precautions in place. Call light within reach. Continue current plan of care.

## 2025-05-28 NOTE — PLAN OF CARE
Problem: Chronic Conditions and Co-morbidities  Goal: Patient's chronic conditions and co-morbidity symptoms are monitored and maintained or improved  5/28/2025 0927 by Gill Georges RN  Outcome: Progressing   Reinforcement of disease process and treatment plan recommended for chronic conditions and co-morbidities.      Problem: Discharge Planning  Goal: Discharge to home or other facility with appropriate resources  5/28/2025 0927 by Gill Georges, RN  Outcome: Progressing  Patient actively participates in ADL's and decision making regarding plan of care.     Problem: Pain  Goal: Verbalizes/displays adequate comfort level or baseline comfort level  5/28/2025 0927 by Gill Georges, RN  Outcome: Progressing  No new signs/symptoms of pain noted, pain rating < 3 on scale 0-10, pain controlled with medication/repositioning.     Problem: Skin/Tissue Integrity  Goal: Skin integrity remains intact  Description: 1.  Monitor for areas of redness and/or skin breakdown2.  Assess vascular access sites hourly3.  Every 4-6 hours minimum:  Change oxygen saturation probe site4.  Every 4-6 hours:  If on nasal continuous positive airway pressure, respiratory therapy assess nares and determine need for appliance change or resting period  5/28/2025 0927 by Gill Georges, RN  Outcome: Progressing  No new skin breakdown noted, no new signs/symptoms of infection, continue to monitor labwork including WBC, medications administered per physician orders.     Problem: Safety - Adult  Goal: Free from fall injury  5/28/2025 0927 by Gill Georges, RN  Outcome: Progressing  No falls/injuries this shift, bed in lowest position, brakes on, bed alarm on, call light in reach, side rails up x2.     Problem: ABCDS Injury Assessment  Goal: Absence of physical injury  5/28/2025 0927 by Gill Georges, RN  Outcome: Progressing

## 2025-05-28 NOTE — PLAN OF CARE
Problem: Chronic Conditions and Co-morbidities  Goal: Patient's chronic conditions and co-morbidity symptoms are monitored and maintained or improved  Outcome: Progressing     Problem: Discharge Planning  Goal: Discharge to home or other facility with appropriate resources  Outcome: Progressing     Problem: Pain  Goal: Verbalizes/displays adequate comfort level or baseline comfort level  Outcome: Progressing  Note: No c/o pain at this time, will continue to monitor and give interventions as indicated       Problem: Skin/Tissue Integrity  Goal: Skin integrity remains intact  Description: 1.  Monitor for areas of redness and/or skin breakdown2.  Assess vascular access sites hourly3.  Every 4-6 hours minimum:  Change oxygen saturation probe site4.  Every 4-6 hours:  If on nasal continuous positive airway pressure, respiratory therapy assess nares and determine need for appliance change or resting period  Outcome: Progressing  Flowsheets (Taken 5/28/2025 0054)  Skin Integrity Remains Intact: Monitor for areas of redness and/or skin breakdown     Problem: Safety - Adult  Goal: Free from fall injury  Outcome: Progressing  Flowsheets (Taken 5/28/2025 0054)  Free From Fall Injury: Instruct family/caregiver on patient safety  Note: All fall precautions in place. Bed locked and in lowest position with alarm on. Overbed table and personal belonings within reach. Call light within reach and patient instructed to use call light for assistance. Non-skid socks on.       Problem: ABCDS Injury Assessment  Goal: Absence of physical injury  Outcome: Progressing  Flowsheets (Taken 5/28/2025 0054)  Absence of Physical Injury: Implement safety measures based on patient assessment     Problem: Neurosensory - Adult  Goal: Achieves stable or improved neurological status  Outcome: Progressing  Goal: Achieves maximal functionality and self care  Outcome: Progressing  Note: No new neuro changes noted

## 2025-05-28 NOTE — PROGRESS NOTES
height  Bathroom Equipment: Grab bars in shower, Grab bars around toilet  Home Equipment: Walker - Rolling, Cane  Has the patient had two or more falls in the past year or any fall with injury in the past year?: No  Prior Level of Assist for ADLs: Independent  Prior Level of Assist for Homemaking: Independent  Prior Level of Assist for Ambulation: Independent household ambulator, with or without device, Independent community ambulator, with or without device (Uses walker at night)  Prior Level of Assist for Transfers: Independent  Active : Yes ( primarily drives)  Occupation: Retired  Type of Occupation: [employer]  Leisure & Hobbies: Shop  Vision/Hearing  Vision  Vision: Impaired  Vision Exceptions: Wears glasses for reading  Hearing  Hearing: Exceptions to WFL  Hearing Exceptions: Bilateral hearing aid (doesn't have hearing aids)    Cognition   Orientation  Overall Orientation Status: Impaired  Orientation Level: Oriented to person;Disoriented to time (\"hospital\")    Objective  Temp: 97.7 °F (36.5 °C)  Pulse: 82  Heart Rate Source: Monitor  Respirations: 16  SpO2: 96 %  O2 Device: None (Room air)  BP: (!) 141/87  MAP (Calculated): 105  BP Location: Left upper arm  BP Method: Automatic  Patient Position: Semi fowlers                AROM RLE (degrees)  RLE AROM: WFL  AROM LLE (degrees)  LLE AROM : WFL  Strength RLE  Strength RLE: WFL  Strength LLE  Strength LLE: WFL           Bed mobility  Supine to Sit: Partial/Moderate assistance  Scooting: Contact guard assistance  Transfers  Sit to Stand: Contact guard assistance  Stand to Sit: Contact guard assistance  Ambulation  Surface: Level tile  Device: Rolling Walker  Assistance: Contact guard assistance  Distance: 10'+20'+40'+40'   Quality of Gait: slow  Gait Deviations: Slow Radha;Increased HOMER;Decreased step length  Stairs/Curb  Stairs?: Yes  Stairs  # Steps : 2  Stairs Height: 6\"  Rails: Bilateral  Device: No Device  Assistance: Contact guard  assistance     Balance  Posture: Good  Sitting - Static: Good  Sitting - Dynamic: Good  Standing - Static: Good  Standing - Dynamic: Good;Fair       AM-PAC - Mobility    AM-PAC Basic Mobility - Inpatient   How much help is needed turning from your back to your side while in a flat bed without using bedrails?: A Little  How much help is needed moving from lying on your back to sitting on the side of a flat bed without using bedrails?: A Lot  How much help is needed moving to and from a bed to a chair?: A Little  How much help is needed standing up from a chair using your arms?: A Little  How much help is needed walking in hospital room?: A Little  How much help is needed climbing 3-5 steps with a railing?: A Little  AM-[facility] Inpatient Mobility Raw Score : 17  AM-PAC Inpatient T-Scale Score : 42.13  Mobility Inpatient CMS 0-100% Score: 50.57  Mobility Inpatient CMS G-Code Modifier : CK    Goals  Short Term Goals  Time Frame for Short Term Goals: d/c  Short Term Goal 1: sup<>sit supervision  Short Term Goal 2: sit<>stand supervision  Short Term Goal 3: amb 150' with RW and supervision  Short Term Goal 4: ascend/descend 2 stairs with BHR and supervision  Patient Goals   Patient Goals : return home when able       Education  Patient Education  Education Given To: Patient  Education Provided: Role of Therapy;Plan of Care  Education Method: Demonstration;Verbal  Barriers to Learning: None  Education Outcome: Verbalized understanding;Demonstrated understanding      Therapy Time   Individual Concurrent Group Co-treatment   Time In 0920         Time Out 1020         Minutes 60         Timed Code Treatment Minutes:  45    Total Treatment Minutes:  60    If the patient is discharged before the next treatment session, this note will serve as the discharge summary.     Arias Hopper, PT, DPT

## 2025-05-28 NOTE — PROGRESS NOTES
Occupational Therapy  Facility/Department: [facility] ORTHO/NEURO  Occupational Therapy Initial Assessment/Treatment    Name: Seema Simpson  : 1935  MRN: [id number]  Date of Service: 2025    Discharge Recommendations:  Subacute/Skilled Nursing Facility (vs home w/24hr assist, home OT)  OT Equipment Recommendations  Equipment Needed: Yes  Mobility Devices: ADL Assistive Devices  ADL Assistive Devices: Shower Chair without back       Patient Diagnosis(es): The primary encounter diagnosis was Subarachnoid hemorrhage following injury, no loss of consciousness, initial encounter (HCC). Diagnoses of Laceration of scalp, initial encounter and Fall, initial encounter were also pertinent to this visit.  Past Medical History:  has a past medical history of Atrial fibrillation ([facility]), Back pain, CAD (coronary artery disease), Cancer (HCC), Hyperlipidemia, Hypertension, and Stroke ([facility]).  Past Surgical History:  has a past surgical history that includes Hysterectomy; Ovary removal; and Mohs surgery (Right, 2023).    Treatment Diagnosis: Impaired ADLand functional mobility      Assessment  Performance deficits / Impairments: Decreased functional mobility ;Decreased ADL status;Decreased balance;Decreased endurance  Assessment: Pt seen s/p fall that resaulted in a subarachnoid hemorrhage.  Pt is from home with  and independent.  Currently, pt req CG for functional mobility and min assist for lower body ADL.  Pt cont to be at risk for falls.  Feel pt would benefit from further skilled IP OT services to maximize safety and functional independence.  If discharged home, pt should have 24 hr assist and home OT.  Treatment Diagnosis: Impaired ADLand functional mobility  Prognosis: Good  Decision Making: Medium Complexity  Activity Tolerance  Activity Tolerance: Patient Tolerated treatment well     Plan  Occupational Therapy Plan  Times Per Week: 5-7  Current Treatment Recommendations: Balance training,  Hobbies: Shop    Objective  Vision  Vision: Impaired  Vision Exceptions: Wears glasses for reading  Hearing  Hearing: Exceptions to WFL  Hearing Exceptions: Bilateral hearing aid;Hard of hearing/hearing concerns          Safety Devices  Type of Devices: Gait belt;Nurse notified;Call light within reach;Chair alarm in place;Left in chair     Toilet Transfers  Toilet - Technique: Ambulating  Equipment Used: Standard toilet (grab bar)  Toilet Transfer: Contact guard assistance (+cues)  AROM: Within functional limits  Strength: Within functional limits  Coordination: Within functional limits  ADL  Grooming: Contact guard assistance;Verbal cueing (to SBA to gordo hands, wipe face, brush teeth, standing at sink.  Assist given to brush hair secondary to scalp laceration/staples.)  LE Dressing: Contact guard assistance  Putting On/Taking Off Footwear: Minimal assistance (to don socks)  Toileting: Contact guard assistance     Activity Tolerance  Activity Tolerance: Patient tolerated evaluation without incident;Patient tolerated treatment well;Treatment limited secondary to decreased cognition  Activity Tolerance Comments: Pt forgetful and needing VC for safety at times (hand placement for transfers, to take RW with her)  Bed mobility  Supine to Sit: Partial/Moderate assistance  Scooting: Contact guard assistance  Transfers  Stand Step Transfers: Contact guard assistance (+cues)  Sit to stand: Contact guard assistance (+cues)  Stand to sit: Contact guard assistance (+cues)  Transfer Comments: Pt walked to/from bathroom and in smith with wheeled walker and CG.  Vision  Vision: Impaired  Vision Exceptions: Wears glasses for reading  Hearing  Hearing: Exceptions to WFL  Hearing Exceptions: Bilateral hearing aid (doesn't have hearing aids)  Cognition  Overall Cognitive Status: Exceptions  Arousal/Alertness: Appears intact  Memory: Decreased recall of precautions;Decreased short term memory  Safety Judgement: Decreased awareness of

## 2025-05-30 NOTE — PROGRESS NOTES
Physician Progress Note      PATIENT:               KAYCEE COLLADO  [facility] #:                  [id number]  :                       1935  ADMIT DATE:       2025 1:00 PM  DISCH DATE:        2025 2:50 PM  RESPONDING  PROVIDER #:        Myke Jacobs DO          QUERY TEXT:    Subarachnoid hemorrhage is documented in the medical record Discharge Summary   by Myke Jacobs DO at 2025.  The patient is on chronic anticoagulation   with Eliquis.  Please clarify the relationship, if any.    The clinical indicators include:  This is a 89 y.o. female with a pmh of CAD, essential hypertension,   hyperlipidemia, paroxysmal A-fib, on chronic anticoagulation, prior stroke who   presents with complaints of nausea and dizziness after a fall.    - \" SAH + SDH. Fall with no loss of consciousness. Patient is on Xarelto and   DAPT. Status post prothrombin complex to reverse Xarelto effect. \" (from H&P   by Irvin Pino MD at 2025)    - \" Patient is a 89 y.o. female on eliquis w/fall and Tsah and small SD. She   reports tripping and striking the back of her head on concrete. She denies   loss of consciousness. Medications include Eliquis. Head CT showed Scattered   extra-axial hyperdensities consistent with subarachnoid hemorrhage, most   prominent in the inferior left frontal region, with possible small subdural   component. Pharmacy found that she has been on eliquis. Pt keeps saying she is   on xarelto, asa and plavix. \" (from Neurosurgery Consults by Tam Gonzalez APRN - CNP at 2025)    - \" SAH + SDH. Patient states that she sustained a fall in the [employer] parking   lot, falling over and hitting the back of the head on the concrete.  Denies   loss of consciousness.  Patient is on aspirin, Plavix and Xarelto.  Last dose   of Xarelto was this morning. \" (from Discharge Summary by Myke Jacobs DO   at 2025)    Prothrombin complex human-lans (BALFAXAR) infusion 2,000 Units,